# Patient Record
Sex: FEMALE | Race: WHITE | NOT HISPANIC OR LATINO | Employment: UNEMPLOYED | URBAN - METROPOLITAN AREA
[De-identification: names, ages, dates, MRNs, and addresses within clinical notes are randomized per-mention and may not be internally consistent; named-entity substitution may affect disease eponyms.]

---

## 2023-03-13 ENCOUNTER — OFFICE VISIT (OUTPATIENT)
Dept: PRIMARY CARE | Facility: CLINIC | Age: 53
End: 2023-03-13
Payer: COMMERCIAL

## 2023-03-13 VITALS
TEMPERATURE: 97.4 F | HEIGHT: 68 IN | WEIGHT: 161.8 LBS | OXYGEN SATURATION: 98 % | DIASTOLIC BLOOD PRESSURE: 85 MMHG | SYSTOLIC BLOOD PRESSURE: 140 MMHG | HEART RATE: 77 BPM | BODY MASS INDEX: 24.52 KG/M2

## 2023-03-13 DIAGNOSIS — J01.01 ACUTE RECURRENT MAXILLARY SINUSITIS: Primary | ICD-10-CM

## 2023-03-13 DIAGNOSIS — J02.9 ACUTE INFECTIVE PHARYNGITIS: ICD-10-CM

## 2023-03-13 DIAGNOSIS — M79.7 FIBROMYALGIA: ICD-10-CM

## 2023-03-13 DIAGNOSIS — E03.9 ACQUIRED HYPOTHYROIDISM: ICD-10-CM

## 2023-03-13 DIAGNOSIS — G43.109 MIGRAINE WITH AURA AND WITHOUT STATUS MIGRAINOSUS, NOT INTRACTABLE: ICD-10-CM

## 2023-03-13 DIAGNOSIS — B96.20 E-COLI UTI: ICD-10-CM

## 2023-03-13 DIAGNOSIS — M24.60 ANKYLOSIS: ICD-10-CM

## 2023-03-13 DIAGNOSIS — N39.0 E-COLI UTI: ICD-10-CM

## 2023-03-13 DIAGNOSIS — E78.2 MIXED HYPERLIPIDEMIA: ICD-10-CM

## 2023-03-13 PROBLEM — G43.909 MIGRAINE, UNSPECIFIED, NOT INTRACTABLE, WITHOUT STATUS MIGRAINOSUS: Status: ACTIVE | Noted: 2023-03-13

## 2023-03-13 PROBLEM — E78.5 HYPERLIPIDEMIA: Status: ACTIVE | Noted: 2023-03-13

## 2023-03-13 PROBLEM — G89.18 POST-OPERATIVE PAIN: Status: ACTIVE | Noted: 2023-03-13

## 2023-03-13 PROBLEM — J32.9 SINUSITIS: Status: ACTIVE | Noted: 2023-03-13

## 2023-03-13 LAB
BASOPHILS (10*3/UL) IN BLOOD BY AUTOMATED COUNT: 0.05 X10E9/L (ref 0–0.1)
BASOPHILS/100 LEUKOCYTES IN BLOOD BY AUTOMATED COUNT: 0.9 % (ref 0–2)
EOSINOPHILS (10*3/UL) IN BLOOD BY AUTOMATED COUNT: 0.03 X10E9/L (ref 0–0.7)
EOSINOPHILS/100 LEUKOCYTES IN BLOOD BY AUTOMATED COUNT: 0.6 % (ref 0–6)
ERYTHROCYTE DISTRIBUTION WIDTH (RATIO) BY AUTOMATED COUNT: 14.1 % (ref 11.5–14.5)
ERYTHROCYTE MEAN CORPUSCULAR HEMOGLOBIN CONCENTRATION (G/DL) BY AUTOMATED: 31.3 G/DL (ref 32–36)
ERYTHROCYTE MEAN CORPUSCULAR VOLUME (FL) BY AUTOMATED COUNT: 93 FL (ref 80–100)
ERYTHROCYTES (10*6/UL) IN BLOOD BY AUTOMATED COUNT: 3.98 X10E12/L (ref 4–5.2)
HEMATOCRIT (%) IN BLOOD BY AUTOMATED COUNT: 37.1 % (ref 36–46)
HEMOGLOBIN (G/DL) IN BLOOD: 11.6 G/DL (ref 12–16)
IMMATURE GRANULOCYTES/100 LEUKOCYTES IN BLOOD BY AUTOMATED COUNT: 0.2 % (ref 0–0.9)
LEUKOCYTES (10*3/UL) IN BLOOD BY AUTOMATED COUNT: 5.4 X10E9/L (ref 4.4–11.3)
LYMPHOCYTES (10*3/UL) IN BLOOD BY AUTOMATED COUNT: 2 X10E9/L (ref 1.2–4.8)
LYMPHOCYTES/100 LEUKOCYTES IN BLOOD BY AUTOMATED COUNT: 37 % (ref 13–44)
MONOCYTES (10*3/UL) IN BLOOD BY AUTOMATED COUNT: 0.55 X10E9/L (ref 0.1–1)
MONOCYTES/100 LEUKOCYTES IN BLOOD BY AUTOMATED COUNT: 10.2 % (ref 2–10)
NEUTROPHILS (10*3/UL) IN BLOOD BY AUTOMATED COUNT: 2.77 X10E9/L (ref 1.2–7.7)
NEUTROPHILS/100 LEUKOCYTES IN BLOOD BY AUTOMATED COUNT: 51.1 % (ref 40–80)
NON-UH HIE RAPID STREP AG: NEGATIVE
NON-UH HIE RS INT QC: PRESENT
NRBC (PER 100 WBCS) BY AUTOMATED COUNT: 0 /100 WBC (ref 0–0)
PLATELETS (10*3/UL) IN BLOOD AUTOMATED COUNT: 438 X10E9/L (ref 150–450)
SEDIMENTATION RATE, ERYTHROCYTE: 35 MM/H (ref 0–30)

## 2023-03-13 PROCEDURE — 87086 URINE CULTURE/COLONY COUNT: CPT

## 2023-03-13 PROCEDURE — 96372 THER/PROPH/DIAG INJ SC/IM: CPT | Performed by: INTERNAL MEDICINE

## 2023-03-13 PROCEDURE — 80053 COMPREHEN METABOLIC PANEL: CPT

## 2023-03-13 PROCEDURE — 99214 OFFICE O/P EST MOD 30 MIN: CPT | Performed by: INTERNAL MEDICINE

## 2023-03-13 PROCEDURE — 86235 NUCLEAR ANTIGEN ANTIBODY: CPT

## 2023-03-13 PROCEDURE — 36415 COLL VENOUS BLD VENIPUNCTURE: CPT

## 2023-03-13 PROCEDURE — 84439 ASSAY OF FREE THYROXINE: CPT

## 2023-03-13 PROCEDURE — 86039 ANTINUCLEAR ANTIBODIES (ANA): CPT

## 2023-03-13 PROCEDURE — 85652 RBC SED RATE AUTOMATED: CPT

## 2023-03-13 PROCEDURE — 84550 ASSAY OF BLOOD/URIC ACID: CPT

## 2023-03-13 PROCEDURE — 86038 ANTINUCLEAR ANTIBODIES: CPT

## 2023-03-13 PROCEDURE — 83735 ASSAY OF MAGNESIUM: CPT

## 2023-03-13 PROCEDURE — 84443 ASSAY THYROID STIM HORMONE: CPT

## 2023-03-13 PROCEDURE — 85025 COMPLETE CBC W/AUTO DIFF WBC: CPT

## 2023-03-13 PROCEDURE — 86431 RHEUMATOID FACTOR QUANT: CPT

## 2023-03-13 PROCEDURE — 1036F TOBACCO NON-USER: CPT | Performed by: INTERNAL MEDICINE

## 2023-03-13 PROCEDURE — 86225 DNA ANTIBODY NATIVE: CPT

## 2023-03-13 RX ORDER — SULFASALAZINE 500 MG/1
1000 TABLET, DELAYED RELEASE ORAL 2 TIMES DAILY
COMMUNITY
End: 2023-11-16 | Stop reason: WASHOUT

## 2023-03-13 RX ORDER — ATORVASTATIN CALCIUM 10 MG/1
1 TABLET, FILM COATED ORAL DAILY
COMMUNITY
Start: 2019-09-12 | End: 2023-06-12 | Stop reason: SDUPTHER

## 2023-03-13 RX ORDER — AMOXICILLIN 500 MG/1
500 TABLET, FILM COATED ORAL EVERY 8 HOURS SCHEDULED
Qty: 21 TABLET | Refills: 0 | Status: SHIPPED | OUTPATIENT
Start: 2023-03-13 | End: 2023-03-21 | Stop reason: ALTCHOICE

## 2023-03-13 RX ORDER — CEFTRIAXONE 1 G/1
1 INJECTION, POWDER, FOR SOLUTION INTRAMUSCULAR; INTRAVENOUS ONCE
Status: COMPLETED | OUTPATIENT
Start: 2023-03-13 | End: 2023-03-13

## 2023-03-13 RX ORDER — LEVOTHYROXINE SODIUM 25 UG/1
1.5 TABLET ORAL DAILY
COMMUNITY
Start: 2014-05-08 | End: 2023-03-21 | Stop reason: SDUPTHER

## 2023-03-13 RX ORDER — ELETRIPTAN HYDROBROMIDE 40 MG/1
40 TABLET, FILM COATED ORAL
COMMUNITY
Start: 2020-10-09 | End: 2023-11-16 | Stop reason: WASHOUT

## 2023-03-13 RX ORDER — METHYLPREDNISOLONE 4 MG/1
TABLET ORAL
Qty: 21 TABLET | Refills: 0 | Status: SHIPPED | OUTPATIENT
Start: 2023-03-13 | End: 2023-03-21 | Stop reason: ALTCHOICE

## 2023-03-13 RX ORDER — CELECOXIB 200 MG/1
200 CAPSULE ORAL DAILY PRN
COMMUNITY
End: 2023-11-16 | Stop reason: WASHOUT

## 2023-03-13 RX ORDER — DULOXETIN HYDROCHLORIDE 30 MG/1
30 CAPSULE, DELAYED RELEASE ORAL EVERY EVENING
COMMUNITY
Start: 2019-11-21 | End: 2023-06-02 | Stop reason: SDUPTHER

## 2023-03-13 RX ORDER — FLUTICASONE PROPIONATE 50 MCG
2 SPRAY, SUSPENSION (ML) NASAL DAILY
COMMUNITY
Start: 2013-05-16 | End: 2023-11-16 | Stop reason: WASHOUT

## 2023-03-13 RX ADMIN — CEFTRIAXONE 1 G: 1 INJECTION, POWDER, FOR SOLUTION INTRAMUSCULAR; INTRAVENOUS at 10:51

## 2023-03-13 NOTE — PROGRESS NOTES
Subjective   Patient ID: Deidra Chacon is a 53 y.o. female who presents for Establish Care (Not feeling well /Sinus pressure/Sore throat/Fever on and off yesterday /Body aches /All since Saturday ).    Assessment/Plan   Problem List Items Addressed This Visit          Musculoskeletal    Fibromyalgia - Primary     Advised follow-up with the rheumatologist continue sulfasalazine and Celebrex Cymbalta         Relevant Orders    CBC and Auto Differential    Comprehensive Metabolic Panel    Magnesium    TSH with reflex to Free T4 if abnormal    Arthritis Panel (CMS)    Ankylosis     Physical therapy         Relevant Orders    CBC and Auto Differential    Comprehensive Metabolic Panel    Magnesium    TSH with reflex to Free T4 if abnormal    Arthritis Panel (CMS)       Endocrine/Metabolic    Hypothyroidism     Check T3-T4 TSH continue Synthroid 25 mcg a day         Relevant Orders    CBC and Auto Differential    Comprehensive Metabolic Panel    Magnesium    TSH with reflex to Free T4 if abnormal    Arthritis Panel (CMS)       Infectious/Inflammatory    Sinusitis     Acute on chronic maxillary recurrent sinusitis with immunocompromised host given Zithromax Medrol Dosepak Rocephin 1 g intramuscular watch for C. difficile colitis and psychosis         Relevant Orders    CBC and Auto Differential    Comprehensive Metabolic Panel    Magnesium    TSH with reflex to Free T4 if abnormal    Arthritis Panel (CMS)    Acute infective pharyngitis     Given antibiotic steroid sent for throat culture         Relevant Orders    CBC and Auto Differential    Comprehensive Metabolic Panel    Magnesium    TSH with reflex to Free T4 if abnormal    Arthritis Panel (CMS)       Other    Hyperlipidemia     Low-fat diet exercise continue Lipitor 10 mg a day         Relevant Orders    CBC and Auto Differential    Comprehensive Metabolic Panel    Magnesium    TSH with reflex to Free T4 if abnormal    Arthritis Panel (CMS)    Migraine, unspecified, not  intractable, without status migrainosus     Continue Relpax         Relevant Orders    CBC and Auto Differential    Comprehensive Metabolic Panel    Magnesium    TSH with reflex to Free T4 if abnormal    Arthritis Panel (CMS)     Other Visit Diagnoses       E-coli UTI        Relevant Orders    Urine Culture          Check CBC BMP TSH lipid hemoglobin A1c urine culture sensitivity and throat culture  Source of history: Nurse, Medical personnel, Medical record, Patient.  History limitation: None.    HPI    Not feeling well Sinus pressure Sore throat Fever on and off yesterday Body aches All since Saturday    Complaining of sore throat sinus pressure onset acutely duration 1 week progressed acutely aggravating factor immunocompromised host change of the weather yellow-green phlegm coming out    Negative for loss of taste or smell    Negative for hematuria rectal bleeding    Patient also had because of underlying infection inflammation allergy migraine attack to the migraine medicine feel better    See you very about polypharmacy we will going to discussed with the OB/GYN and rheumatologist about changing medication.    Allergies   Allergen Reactions   • Azithromycin Unknown   • Oseltamivir Itching       Current Outpatient Medications   Medication Sig Dispense Refill   • atorvastatin (Lipitor) 10 mg tablet Take 1 tablet (10 mg) by mouth once daily.     • celecoxib (CeleBREX) 200 mg capsule Take 1 capsule (200 mg) by mouth once daily as needed.     • DULoxetine (Cymbalta) 30 mg DR capsule Take 1 capsule (30 mg) by mouth once daily in the evening.     • eletriptan (Relpax) 40 mg tablet Take 1 tablet (40 mg) by mouth. Onset on migraine. May repeat in 2 hours. Max 2 doses/24 hours.no more than 3 days per week     • fluticasone (Flonase) 50 mcg/actuation nasal spray Administer 2 sprays into each nostril once daily.     • levothyroxine (Synthroid, Levoxyl) 25 mcg tablet Take 1.5 tablets (37.5 mcg) by mouth once daily.     •  "sulfaSALAzine (Azulfidine) 500 mg DR tablet Take 2 tablets (1,000 mg) by mouth in the morning and 2 tablets (1,000 mg) before bedtime. Do not crush, chew, or split. .       No current facility-administered medications for this visit.       Objective   Visit Vitals  /85 (BP Location: Left arm, Patient Position: Sitting, BP Cuff Size: Adult)   Pulse 77   Temp 36.3 °C (97.4 °F)   Ht 1.727 m (5' 8\")   Wt 73.4 kg (161 lb 12.8 oz)   SpO2 98%   BMI 24.60 kg/m²   Smoking Status Never   BSA 1.88 m²     Physical Exam  Review of Systems    No visits with results within 4 Month(s) from this visit.   Latest known visit with results is:   Legacy Encounter on 09/07/2021   Component Date Value Ref Range Status   • Vitamin D, 25-Hydroxy 09/07/2021 35  ng/mL Final   • TSH 09/07/2021 3.22  0.44 - 3.98 mIU/L Final   • Glucose 09/07/2021 101 (H)  74 - 99 mg/dL Final   • Sodium 09/07/2021 141  136 - 145 mmol/L Final   • Potassium 09/07/2021 4.3  3.5 - 5.3 mmol/L Final   • Chloride 09/07/2021 103  98 - 107 mmol/L Final   • Bicarbonate 09/07/2021 28  21 - 32 mmol/L Final   • Anion Gap 09/07/2021 14  10 - 20 mmol/L Final   • Urea Nitrogen 09/07/2021 8  6 - 23 mg/dL Final   • Creatinine 09/07/2021 0.67  0.50 - 1.05 mg/dL Final   • GLOMERULAR FILTRATION RATE-NON AFR* 09/07/2021 >60  >60 mL/min/1.73m2 Final   • GLOMERULAR FILTRATION RATE-* 09/07/2021 >60  >60 mL/min/1.73m2 Final   • Calcium 09/07/2021 9.0  8.6 - 10.6 mg/dL Final   • Albumin 09/07/2021 4.2  3.4 - 5.0 g/dL Final   • Alkaline Phosphatase 09/07/2021 100  33 - 110 U/L Final   • Total Protein 09/07/2021 6.7  6.4 - 8.2 g/dL Final   • AST 09/07/2021 18  9 - 39 U/L Final   • Total Bilirubin 09/07/2021 0.4  0.0 - 1.2 mg/dL Final   • ALT (SGPT) 09/07/2021 17  7 - 45 U/L Final   • WBC 09/07/2021 5.3  4.4 - 11.3 x10E9/L Final   • nRBC 09/07/2021 0.0  0.0 - 0.0 /100 WBC Final   • RBC 09/07/2021 3.64 (L)  4.00 - 5.20 x10E12/L Final   • Hemoglobin 09/07/2021 11.2 (L)  12.0 - 16.0 " g/dL Final   • Hematocrit 09/07/2021 36.4  36.0 - 46.0 % Final   • MCV 09/07/2021 100  80 - 100 fL Final   • MCHC 09/07/2021 30.8 (L)  32.0 - 36.0 g/dL Final   • Platelets 09/07/2021 381  150 - 450 x10E9/L Final   • RDW 09/07/2021 13.2  11.5 - 14.5 % Final   • Neutrophils % 09/07/2021 60.2  40.0 - 80.0 % Final   • Immature Granulocytes %, Automated 09/07/2021 0.4  0.0 - 0.9 % Final   • Lymphocytes % 09/07/2021 30.1  13.0 - 44.0 % Final   • Monocytes % 09/07/2021 7.5  2.0 - 10.0 % Final   • Eosinophils % 09/07/2021 0.9  0.0 - 6.0 % Final   • Basophils % 09/07/2021 0.9  0.0 - 2.0 % Final   • Neutrophils Absolute 09/07/2021 3.21  1.20 - 7.70 x10E9/L Final   • Lymphocytes Absolute 09/07/2021 1.61  1.20 - 4.80 x10E9/L Final   • Monocytes Absolute 09/07/2021 0.40  0.10 - 1.00 x10E9/L Final   • Eosinophils Absolute 09/07/2021 0.05  0.00 - 0.70 x10E9/L Final   • Basophils Absolute 09/07/2021 0.05  0.00 - 0.10 x10E9/L Final   • Cortisol- AM 09/07/2021 16.7  5.0 - 20.0 ug/dL Final   • Hemoglobin A1C 09/07/2021 5.2  % Final   • Estimated Average Glucose 09/07/2021 103  MG/DL Final   • Cholesterol 09/07/2021 174  0 - 199 mg/dL Final   • HDL 09/07/2021 77.4  mg/dL Final   • Cholesterol/HDL Ratio 09/07/2021 2.2   Final   • LDL 09/07/2021 83  0 - 99 mg/dL Final   • VLDL 09/07/2021 13  0 - 40 mg/dL Final   • Triglycerides 09/07/2021 66  0 - 149 mg/dL Final       Radiology: Reviewed imaging in powerchart.  No results found.    Family History   Problem Relation Name Age of Onset   • COPD Mother          severe   • Other (cardiac disorder) Father     • Diabetes Father     • Migraines Father     • Cancer Maternal Grandmother     • Cancer Maternal Grandfather     • Heart failure Paternal Grandmother     • Heart attack Paternal Grandfather       Social History     Socioeconomic History   • Marital status:      Spouse name: None   • Number of children: None   • Years of education: None   • Highest education level: None    Occupational History   • None   Tobacco Use   • Smoking status: Never   • Smokeless tobacco: Never   Vaping Use   • Vaping status: None   Substance and Sexual Activity   • Alcohol use: Yes     Comment: occasional   • Drug use: Never   • Sexual activity: None   Other Topics Concern   • None   Social History Narrative   • None     Social Determinants of Health     Financial Resource Strain: Not on file   Food Insecurity: Not on file   Transportation Needs: Not on file   Physical Activity: Not on file   Stress: Not on file   Social Connections: Not on file   Intimate Partner Violence: Not on file   Housing Stability: Not on file     Past Medical History:   Diagnosis Date   • Candidiasis, unspecified 08/06/2020    Yeast infection   • Cough, unspecified 08/01/2016    Cough   • Inflammatory polyarthropathy (CMS/[facility]) 09/12/2019    Polyarthritis, inflammatory   • Other specified complications of surgical and medical care, not elsewhere classified, initial encounter 12/02/2021    Fluid collection at surgical site   • Otitis media, unspecified, unspecified ear 01/12/2016    Ear infection   • Pain in left hip 05/22/2018    Left hip pain   • Pain in right knee 09/21/2016    Right knee pain   • Pain in right shoulder 09/12/2019    Right shoulder pain, unspecified chronicity   • Pelvic and perineal pain 10/14/2021    Female pelvic pain   • Personal history of diseases of the blood and blood-forming organs and certain disorders involving the immune mechanism 09/12/2019    History of autoimmune disorder   • Personal history of other diseases of the digestive system 12/02/2021    History of umbilical hernia   • Personal history of other diseases of the digestive system 12/10/2020    History of hemorrhoids   • Personal history of other diseases of the musculoskeletal system and connective tissue 06/17/2019    History of low back pain   • Personal history of other diseases of the musculoskeletal system and connective tissue  06/23/2016    History of bursitis   • Personal history of other diseases of the nervous system and sense organs 09/02/2021    History of carpal tunnel syndrome   • Personal history of other endocrine, nutritional and metabolic disease 12/17/2019    History of vitamin D deficiency   • Personal history of other mental and behavioral disorders 07/27/2020    History of depression   • Personal history of other specified conditions     History of abdominal pain   • Personal history of urinary (tract) infections     History of urinary tract infection   • Radiculopathy, cervical region 09/09/2019    Cervical radiculopathy   • Radiculopathy, lumbar region 06/17/2019    Lumbar radiculopathy   • Radiculopathy, lumbar region 09/09/2019    Chronic lumbar radiculopathy   • Spinal stenosis, lumbar region without neurogenic claudication 01/19/2021    Spinal stenosis of lumbar region without neurogenic claudication   • Unspecified urinary incontinence 10/07/2021    Urinary incontinence in female   • Urge incontinence 10/07/2021    Urge incontinence     Past Surgical History:   Procedure Laterality Date   • HYSTERECTOMY  08/01/2016    Hysterectomy   • OTHER SURGICAL HISTORY  10/07/2021    Carpal tunnel surgery   • OTHER SURGICAL HISTORY  10/07/2021    Vaginal sling procedure   • OTHER SURGICAL HISTORY  12/02/2021    Umbilical hernia repair laparoscopic   • SINUS SURGERY  08/01/2016    Sinus Surgery       Charting was completed using voice recognition technology and may include unintended errors.

## 2023-03-13 NOTE — ASSESSMENT & PLAN NOTE
Acute on chronic maxillary recurrent sinusitis with immunocompromised host given Zithromax Medrol Dosepak Rocephin 1 g intramuscular watch for C. difficile colitis and psychosis

## 2023-03-14 ENCOUNTER — TELEPHONE (OUTPATIENT)
Dept: PRIMARY CARE | Facility: CLINIC | Age: 53
End: 2023-03-14
Payer: COMMERCIAL

## 2023-03-14 LAB
ALANINE AMINOTRANSFERASE (SGPT) (U/L) IN SER/PLAS: 9 U/L (ref 7–45)
ALBUMIN (G/DL) IN SER/PLAS: 4.5 G/DL (ref 3.4–5)
ALKALINE PHOSPHATASE (U/L) IN SER/PLAS: 83 U/L (ref 33–110)
ANION GAP IN SER/PLAS: 13 MMOL/L (ref 10–20)
ASPARTATE AMINOTRANSFERASE (SGOT) (U/L) IN SER/PLAS: 12 U/L (ref 9–39)
BILIRUBIN TOTAL (MG/DL) IN SER/PLAS: 0.3 MG/DL (ref 0–1.2)
CALCIUM (MG/DL) IN SER/PLAS: 9.8 MG/DL (ref 8.6–10.6)
CARBON DIOXIDE, TOTAL (MMOL/L) IN SER/PLAS: 29 MMOL/L (ref 21–32)
CHLORIDE (MMOL/L) IN SER/PLAS: 103 MMOL/L (ref 98–107)
CREATININE (MG/DL) IN SER/PLAS: 0.63 MG/DL (ref 0.5–1.05)
GFR FEMALE: >90 ML/MIN/1.73M2
GLUCOSE (MG/DL) IN SER/PLAS: 79 MG/DL (ref 74–99)
MAGNESIUM (MG/DL) IN SER/PLAS: 2.24 MG/DL (ref 1.6–2.4)
POTASSIUM (MMOL/L) IN SER/PLAS: 4.7 MMOL/L (ref 3.5–5.3)
PROTEIN TOTAL: 7.1 G/DL (ref 6.4–8.2)
RHEUMATOID FACTOR (IU/ML) IN SERUM OR PLASMA: <10 IU/ML (ref 0–15)
SODIUM (MMOL/L) IN SER/PLAS: 140 MMOL/L (ref 136–145)
THYROTROPIN (MIU/L) IN SER/PLAS BY DETECTION LIMIT <= 0.05 MIU/L: 5.11 MIU/L (ref 0.44–3.98)
THYROXINE (T4) FREE (NG/DL) IN SER/PLAS: 1.09 NG/DL (ref 0.78–1.48)
URATE (MG/DL) IN SER/PLAS: 3.3 MG/DL (ref 2.3–6.7)
UREA NITROGEN (MG/DL) IN SER/PLAS: 11 MG/DL (ref 6–23)

## 2023-03-14 NOTE — TELEPHONE ENCOUNTER
----- Message from Han Caldera MD sent at 3/14/2023  8:11 AM EDT -----  Multiple abnormal test follow-up in a 1 week in office bring all medication with you

## 2023-03-15 LAB
ANA PATTERN: ABNORMAL
ANA TITER: ABNORMAL
ANTI-CENTROMERE: <0.2 AI
ANTI-CHROMATIN: <0.2 AI
ANTI-DNA (DS): 8 IU/ML
ANTI-JO-1 IGG: <0.2 AI
ANTI-NUCLEAR ANTIBODY (ANA): POSITIVE
ANTI-RIBOSOMAL P: <0.2 AI
ANTI-RNP: 4 AI
ANTI-SCL-70: <0.2 AI
ANTI-SM/RNP: <0.2 AI
ANTI-SM: <0.2 AI
ANTI-SSA: <0.2 AI
ANTI-SSB: <0.2 AI
URINE CULTURE: NORMAL

## 2023-03-16 ENCOUNTER — TELEPHONE (OUTPATIENT)
Dept: PRIMARY CARE | Facility: CLINIC | Age: 53
End: 2023-03-16
Payer: COMMERCIAL

## 2023-03-16 NOTE — TELEPHONE ENCOUNTER
----- Message from Han Caldera MD sent at 3/16/2023 11:26 AM EDT -----  Your autoimmune work is abnormal advised follow-up with the rheumatologist possible considering Humira injection

## 2023-03-21 ENCOUNTER — OFFICE VISIT (OUTPATIENT)
Dept: PRIMARY CARE | Facility: CLINIC | Age: 53
End: 2023-03-21
Payer: COMMERCIAL

## 2023-03-21 VITALS
WEIGHT: 161 LBS | SYSTOLIC BLOOD PRESSURE: 108 MMHG | BODY MASS INDEX: 24.4 KG/M2 | HEART RATE: 88 BPM | HEIGHT: 68 IN | DIASTOLIC BLOOD PRESSURE: 65 MMHG | OXYGEN SATURATION: 98 %

## 2023-03-21 DIAGNOSIS — R76.8 ANTI-RNP ANTIBODIES PRESENT: ICD-10-CM

## 2023-03-21 DIAGNOSIS — D50.0 IRON DEFICIENCY ANEMIA DUE TO CHRONIC BLOOD LOSS: ICD-10-CM

## 2023-03-21 DIAGNOSIS — E78.2 MIXED HYPERLIPIDEMIA: ICD-10-CM

## 2023-03-21 DIAGNOSIS — E03.9 ACQUIRED HYPOTHYROIDISM: Primary | ICD-10-CM

## 2023-03-21 PROBLEM — J32.9 SINUSITIS: Status: RESOLVED | Noted: 2023-03-13 | Resolved: 2023-03-21

## 2023-03-21 PROBLEM — J02.9 ACUTE INFECTIVE PHARYNGITIS: Status: RESOLVED | Noted: 2023-03-13 | Resolved: 2023-03-21

## 2023-03-21 PROCEDURE — 99213 OFFICE O/P EST LOW 20 MIN: CPT | Performed by: INTERNAL MEDICINE

## 2023-03-21 PROCEDURE — 1036F TOBACCO NON-USER: CPT | Performed by: INTERNAL MEDICINE

## 2023-03-21 RX ORDER — LEVOTHYROXINE SODIUM 25 UG/1
50 TABLET ORAL DAILY
Qty: 90 TABLET | Refills: 1 | Status: SHIPPED | OUTPATIENT
Start: 2023-03-21 | End: 2023-03-23

## 2023-03-21 NOTE — PROGRESS NOTES
Patient ID: Deidra Chacon is a 53 y.o. female who presents for Establish Care (Go over blood work ).    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  History limitation: None.      HPI arthralgia myalgia fatigue tired weakness onset gradually duration few weeks to months progress slowly aggravating factor change of the weather stress relieving factors none associated problem autoimmune disease positive anti-RNP antibody and anti-DNA antibody and uncontrolled hypothyroidism    Negative for suicide    Negative for fever    Negative for nausea vomiting diarrhea constipation    Allergies   Allergen Reactions    Azithromycin Unknown    Oseltamivir Itching       Current Outpatient Medications   Medication Sig Dispense Refill    atorvastatin (Lipitor) 10 mg tablet Take 1 tablet (10 mg) by mouth once daily.      celecoxib (CeleBREX) 200 mg capsule Take 1 capsule (200 mg) by mouth once daily as needed.      DULoxetine (Cymbalta) 30 mg DR capsule Take 1 capsule (30 mg) by mouth once daily in the evening.      eletriptan (Relpax) 40 mg tablet Take 1 tablet (40 mg) by mouth. Onset on migraine. May repeat in 2 hours. Max 2 doses/24 hours.no more than 3 days per week      fluticasone (Flonase) 50  "mcg/actuation nasal spray Administer 2 sprays into each nostril once daily.      sulfaSALAzine (Azulfidine) 500 mg DR tablet Take 2 tablets (1,000 mg) by mouth in the morning and 2 tablets (1,000 mg) before bedtime. Do not crush, chew, or split. .      levothyroxine (Synthroid, Levoxyl) 25 mcg tablet Take 2 tablets (50 mcg) by mouth once daily. 90 tablet 1     No current facility-administered medications for this visit.       Objective   Visit Vitals  /65 (BP Location: Left arm, Patient Position: Sitting, BP Cuff Size: Adult)   Pulse 88   Ht 1.727 m (5' 8\")   Wt 73 kg (161 lb)   SpO2 98%   BMI 24.48 kg/m²   Smoking Status Never   BSA 1.87 m²       PHYSICAL EXAM  General: NAD. NCAT. Aox3   HEENT: PERRLA. EOMI. MMM. Nares patent bl.  Cardiovascular: RRR. No MRG. S1/S2 wnl.   Respiratory: CTABL. No acute respiratory distress.   GI: Soft, NT abdomen. BS present x 4.   : No CVAT BL  MSK: Myalgia arthralgia anemia  Extremities: Multiple muscle and joint tenderness myalgia arthralgia  Skin: No rashes or bruises.   Neuro: Aox3. Cranial Nerves grossly intact. Motor/sensory wnl.   Psych: Mood wnl.      ROS  Constitutional: Denies fevers, chills, fatigue, weight loss/gain  HEENT: Denies HA, vision changes, hearing loss, sore throat  Cardiac: Denies CP, palpitations, edema  Respiratory: Denies SOB, cough, pleuritic chest pain, PND, orthopnea  GI: Denies N/V/D, abd pain, constipation, black/bloody stools  : Denies urinary changes, frequency, hematuria, urgency, retention, flank pain  MSK: Denies joint pain, joint swelling, back pain, neck pain, extremity pain  Neuro: Denies numbness, weakness, tingling    Immunization History   Administered Date(s) Administered    Pneumococcal Conjugate PCV 13 09/09/2019           Radiology: Reviewed imaging in powerchart.  No results found.    Family History   Problem Relation Name Age of Onset    COPD Mother          severe    Other (cardiac disorder) Father      Diabetes Father      " Migraines Father      Cancer Maternal Grandmother      Cancer Maternal Grandfather      Heart failure Paternal Grandmother      Heart attack Paternal Grandfather       Social History     Socioeconomic History    Marital status:      Spouse name: None    Number of children: None    Years of education: None    Highest education level: None   Occupational History    None   Tobacco Use    Smoking status: Never    Smokeless tobacco: Never   Vaping Use    Vaping status: None   Substance and Sexual Activity    Alcohol use: Yes     Comment: occasional    Drug use: Never    Sexual activity: None   Other Topics Concern    None   Social History Narrative    None     Social Determinants of Health     Financial Resource Strain: Not on file   Food Insecurity: Not on file   Transportation Needs: Not on file   Physical Activity: Not on file   Stress: Not on file   Social Connections: Not on file   Intimate Partner Violence: Not on file   Housing Stability: Not on file     Past Medical History:   Diagnosis Date    Candidiasis, unspecified 08/06/2020    Yeast infection    Cough, unspecified 08/01/2016    Cough    Inflammatory polyarthropathy (CMS/HCC) 09/12/2019    Polyarthritis, inflammatory    Other specified complications of surgical and medical care, not elsewhere classified, initial encounter 12/02/2021    Fluid collection at surgical site    Otitis media, unspecified, unspecified ear 01/12/2016    Ear infection    Pain in left hip 05/22/2018    Left hip pain    Pain in right knee 09/21/2016    Right knee pain    Pain in right shoulder 09/12/2019    Right shoulder pain, unspecified chronicity    Pelvic and perineal pain 10/14/2021    Female pelvic pain    Personal history of diseases of the blood and blood-forming organs and certain disorders involving the immune mechanism 09/12/2019    History of autoimmune disorder    Personal history of other diseases of the digestive system 12/02/2021    History of umbilical hernia     Personal history of other diseases of the digestive system 12/10/2020    History of hemorrhoids    Personal history of other diseases of the musculoskeletal system and connective tissue 06/17/2019    History of low back pain    Personal history of other diseases of the musculoskeletal system and connective tissue 06/23/2016    History of bursitis    Personal history of other diseases of the nervous system and sense organs 09/02/2021    History of carpal tunnel syndrome    Personal history of other endocrine, nutritional and metabolic disease 12/17/2019    History of vitamin D deficiency    Personal history of other mental and behavioral disorders 07/27/2020    History of depression    Personal history of other specified conditions     History of abdominal pain    Personal history of urinary (tract) infections     History of urinary tract infection    Radiculopathy, cervical region 09/09/2019    Cervical radiculopathy    Radiculopathy, lumbar region 06/17/2019    Lumbar radiculopathy    Radiculopathy, lumbar region 09/09/2019    Chronic lumbar radiculopathy    Spinal stenosis, lumbar region without neurogenic claudication 01/19/2021    Spinal stenosis of lumbar region without neurogenic claudication    Unspecified urinary incontinence 10/07/2021    Urinary incontinence in female    Urge incontinence 10/07/2021    Urge incontinence     Past Surgical History:   Procedure Laterality Date    HYSTERECTOMY  08/01/2016    Hysterectomy    OTHER SURGICAL HISTORY  10/07/2021    Carpal tunnel surgery    OTHER SURGICAL HISTORY  10/07/2021    Vaginal sling procedure    OTHER SURGICAL HISTORY  12/02/2021    Umbilical hernia repair laparoscopic    SINUS SURGERY  08/01/2016    Sinus Surgery     * Cannot find OR log *    Charting was completed using voice recognition technology and may include unintended errors.

## 2023-03-23 DIAGNOSIS — E03.9 ACQUIRED HYPOTHYROIDISM: ICD-10-CM

## 2023-03-23 RX ORDER — LEVOTHYROXINE SODIUM 50 UG/1
50 TABLET ORAL DAILY
Qty: 90 TABLET | Refills: 1 | Status: SHIPPED | OUTPATIENT
Start: 2023-03-23 | End: 2023-09-18

## 2023-05-19 ENCOUNTER — LAB (OUTPATIENT)
Dept: LAB | Facility: LAB | Age: 53
End: 2023-05-19
Payer: COMMERCIAL

## 2023-05-19 ENCOUNTER — OFFICE VISIT (OUTPATIENT)
Dept: PRIMARY CARE | Facility: CLINIC | Age: 53
End: 2023-05-19
Payer: COMMERCIAL

## 2023-05-19 VITALS
SYSTOLIC BLOOD PRESSURE: 120 MMHG | TEMPERATURE: 98.7 F | BODY MASS INDEX: 24.55 KG/M2 | DIASTOLIC BLOOD PRESSURE: 75 MMHG | HEART RATE: 77 BPM | OXYGEN SATURATION: 98 % | HEIGHT: 68 IN | WEIGHT: 162 LBS

## 2023-05-19 DIAGNOSIS — L50.9 URTICARIA OF ENTIRE BODY: ICD-10-CM

## 2023-05-19 DIAGNOSIS — E03.8 HYPOTHYROIDISM DUE TO HASHIMOTO'S THYROIDITIS: ICD-10-CM

## 2023-05-19 DIAGNOSIS — E06.3 HYPOTHYROIDISM DUE TO HASHIMOTO'S THYROIDITIS: ICD-10-CM

## 2023-05-19 DIAGNOSIS — R76.8 ANTI-RNP ANTIBODIES PRESENT: ICD-10-CM

## 2023-05-19 DIAGNOSIS — L23.7 POISON IVY: Primary | ICD-10-CM

## 2023-05-19 LAB
ALANINE AMINOTRANSFERASE (SGPT) (U/L) IN SER/PLAS: 12 U/L (ref 7–45)
ALBUMIN (G/DL) IN SER/PLAS: 4.3 G/DL (ref 3.4–5)
ALKALINE PHOSPHATASE (U/L) IN SER/PLAS: 76 U/L (ref 33–110)
ANION GAP IN SER/PLAS: 12 MMOL/L (ref 10–20)
ASPARTATE AMINOTRANSFERASE (SGOT) (U/L) IN SER/PLAS: 15 U/L (ref 9–39)
BASOPHILS (10*3/UL) IN BLOOD BY AUTOMATED COUNT: 0.02 X10E9/L (ref 0–0.1)
BASOPHILS/100 LEUKOCYTES IN BLOOD BY AUTOMATED COUNT: 0.2 % (ref 0–2)
BILIRUBIN TOTAL (MG/DL) IN SER/PLAS: 0.3 MG/DL (ref 0–1.2)
CALCIUM (MG/DL) IN SER/PLAS: 9.6 MG/DL (ref 8.6–10.6)
CARBON DIOXIDE, TOTAL (MMOL/L) IN SER/PLAS: 28 MMOL/L (ref 21–32)
CHLORIDE (MMOL/L) IN SER/PLAS: 102 MMOL/L (ref 98–107)
CREATININE (MG/DL) IN SER/PLAS: 0.68 MG/DL (ref 0.5–1.05)
EOSINOPHILS (10*3/UL) IN BLOOD BY AUTOMATED COUNT: 0.01 X10E9/L (ref 0–0.7)
EOSINOPHILS/100 LEUKOCYTES IN BLOOD BY AUTOMATED COUNT: 0.1 % (ref 0–6)
ERYTHROCYTE DISTRIBUTION WIDTH (RATIO) BY AUTOMATED COUNT: 14 % (ref 11.5–14.5)
ERYTHROCYTE MEAN CORPUSCULAR HEMOGLOBIN CONCENTRATION (G/DL) BY AUTOMATED: 31.1 G/DL (ref 32–36)
ERYTHROCYTE MEAN CORPUSCULAR VOLUME (FL) BY AUTOMATED COUNT: 92 FL (ref 80–100)
ERYTHROCYTES (10*6/UL) IN BLOOD BY AUTOMATED COUNT: 4.36 X10E12/L (ref 4–5.2)
GFR FEMALE: >90 ML/MIN/1.73M2
GLUCOSE (MG/DL) IN SER/PLAS: 88 MG/DL (ref 74–99)
HEMATOCRIT (%) IN BLOOD BY AUTOMATED COUNT: 39.9 % (ref 36–46)
HEMOGLOBIN (G/DL) IN BLOOD: 12.4 G/DL (ref 12–16)
IMMATURE GRANULOCYTES/100 LEUKOCYTES IN BLOOD BY AUTOMATED COUNT: 0.1 % (ref 0–0.9)
LEUKOCYTES (10*3/UL) IN BLOOD BY AUTOMATED COUNT: 9.2 X10E9/L (ref 4.4–11.3)
LYMPHOCYTES (10*3/UL) IN BLOOD BY AUTOMATED COUNT: 1.56 X10E9/L (ref 1.2–4.8)
LYMPHOCYTES/100 LEUKOCYTES IN BLOOD BY AUTOMATED COUNT: 16.9 % (ref 13–44)
MONOCYTES (10*3/UL) IN BLOOD BY AUTOMATED COUNT: 0.49 X10E9/L (ref 0.1–1)
MONOCYTES/100 LEUKOCYTES IN BLOOD BY AUTOMATED COUNT: 5.3 % (ref 2–10)
NEUTROPHILS (10*3/UL) IN BLOOD BY AUTOMATED COUNT: 7.14 X10E9/L (ref 1.2–7.7)
NEUTROPHILS/100 LEUKOCYTES IN BLOOD BY AUTOMATED COUNT: 77.4 % (ref 40–80)
NRBC (PER 100 WBCS) BY AUTOMATED COUNT: 0 /100 WBC (ref 0–0)
PLATELETS (10*3/UL) IN BLOOD AUTOMATED COUNT: 401 X10E9/L (ref 150–450)
POTASSIUM (MMOL/L) IN SER/PLAS: 4.3 MMOL/L (ref 3.5–5.3)
PROTEIN TOTAL: 7.2 G/DL (ref 6.4–8.2)
SODIUM (MMOL/L) IN SER/PLAS: 138 MMOL/L (ref 136–145)
THYROTROPIN (MIU/L) IN SER/PLAS BY DETECTION LIMIT <= 0.05 MIU/L: 1.94 MIU/L (ref 0.44–3.98)
UREA NITROGEN (MG/DL) IN SER/PLAS: 14 MG/DL (ref 6–23)

## 2023-05-19 PROCEDURE — 36415 COLL VENOUS BLD VENIPUNCTURE: CPT

## 2023-05-19 PROCEDURE — 96372 THER/PROPH/DIAG INJ SC/IM: CPT | Performed by: INTERNAL MEDICINE

## 2023-05-19 PROCEDURE — 84443 ASSAY THYROID STIM HORMONE: CPT

## 2023-05-19 PROCEDURE — 99214 OFFICE O/P EST MOD 30 MIN: CPT | Performed by: INTERNAL MEDICINE

## 2023-05-19 PROCEDURE — 85025 COMPLETE CBC W/AUTO DIFF WBC: CPT

## 2023-05-19 PROCEDURE — 1036F TOBACCO NON-USER: CPT | Performed by: INTERNAL MEDICINE

## 2023-05-19 PROCEDURE — 80053 COMPREHEN METABOLIC PANEL: CPT

## 2023-05-19 RX ORDER — FLUOCINONIDE 0.5 MG/G
CREAM TOPICAL 2 TIMES DAILY
Qty: 100 G | Refills: 0 | Status: SHIPPED | OUTPATIENT
Start: 2023-05-19 | End: 2023-11-16 | Stop reason: WASHOUT

## 2023-05-19 RX ORDER — METHYLPREDNISOLONE 4 MG/1
TABLET ORAL
Qty: 21 TABLET | Refills: 0 | Status: SHIPPED | OUTPATIENT
Start: 2023-05-19 | End: 2023-05-26

## 2023-05-19 RX ORDER — HYDROXYZINE HYDROCHLORIDE 25 MG/1
25 TABLET, FILM COATED ORAL 2 TIMES DAILY
Qty: 20 TABLET | Refills: 0 | Status: SHIPPED | OUTPATIENT
Start: 2023-05-19 | End: 2023-11-16 | Stop reason: WASHOUT

## 2023-05-19 RX ORDER — METHYLPREDNISOLONE ACETATE 40 MG/ML
80 INJECTION, SUSPENSION INTRA-ARTICULAR; INTRALESIONAL; INTRAMUSCULAR; SOFT TISSUE ONCE
Status: COMPLETED | OUTPATIENT
Start: 2023-05-19 | End: 2023-05-19

## 2023-05-19 RX ORDER — CETIRIZINE HYDROCHLORIDE 10 MG/1
TABLET ORAL
COMMUNITY
Start: 2021-10-19 | End: 2023-11-16 | Stop reason: WASHOUT

## 2023-05-19 RX ORDER — HYDROXYCHLOROQUINE SULFATE 200 MG/1
2 TABLET, FILM COATED ORAL DAILY
COMMUNITY
Start: 2023-03-27 | End: 2023-11-16 | Stop reason: WASHOUT

## 2023-05-19 RX ORDER — ERGOCALCIFEROL 1.25 MG/1
1 CAPSULE ORAL
COMMUNITY
Start: 2023-05-14

## 2023-05-19 RX ADMIN — METHYLPREDNISOLONE ACETATE 80 MG: 40 INJECTION, SUSPENSION INTRA-ARTICULAR; INTRALESIONAL; INTRAMUSCULAR; SOFT TISSUE at 16:37

## 2023-05-19 NOTE — PROGRESS NOTES
Patient ID: Deidra Chacon is a 53 y.o. female who presents for Rash (All over since yesterday ).    Assessment/Plan     Problem List Items Addressed This Visit          Endocrine/Metabolic    Hypothyroidism     Endocrine work-up check sugar thyroid and autoimmune disease         Relevant Orders    TSH with reflex to Free T4 if abnormal    CBC and Auto Differential    Comprehensive Metabolic Panel       Hematologic    Anti-RNP antibodies present    Relevant Orders    TSH with reflex to Free T4 if abnormal    CBC and Auto Differential    Comprehensive Metabolic Panel       Infectious/Inflammatory    Poison ivy - Primary     Education provided for contact dermatitis            Other    Urticaria of entire body     Given steroid injection advised Lidex cream locally Benadryl Atarax Pepcid probiotics            Source of history: Nurse, Medical personnel, Medical record, Patient.  History limitation: None.      HPI  53-year-old patient of autoimmune disease hypothyroidism hyperlipidemia complaining of the pruritus maculopapular nonvesicular rash from the hip to 2 moderate-sized pruritus insomnia onset acutely duration 48 hours progressed acutely aggravating factor something context rest and sun exposure and reviewed exposure    Negative for choking    Negative for shortness of breath hypoxia    Negative for anaphylaxis    Allergies   Allergen Reactions    Azithromycin Unknown    Cephalexin Other     yeast infections    Oseltamivir Itching and Rash       Medications    Current Outpatient Medications   Medication Sig Dispense Refill    atorvastatin (Lipitor) 10 mg tablet Take 1 tablet (10 mg) by mouth once daily.      celecoxib (CeleBREX) 200 mg capsule Take 1 capsule (200 mg) by mouth once daily as needed. As needed      cetirizine (ZyrTEC) 10 mg tablet Orally Once a day PRN, Date: 08/05/2022 11:50:00 EDT      DULoxetine (Cymbalta) 30 mg DR capsule Take 1 capsule (30 mg) by mouth once daily in the evening.      eletriptan  "(Relpax) 40 mg tablet Take 1 tablet (40 mg) by mouth. Onset on migraine. May repeat in 2 hours. Max 2 doses/24 hours.no more than 3 days per week      ergocalciferol (Vitamin D-2) 1.25 MG (07106 UT) capsule Take 1 capsule (1,250 mcg) by mouth.      fluticasone (Flonase) 50 mcg/actuation nasal spray Administer 2 sprays into each nostril once daily.      hydroxychloroquine (Plaquenil) 200 mg tablet Take 2 tablets (400 mg) by mouth once daily.      levothyroxine (Synthroid, Levoxyl) 50 mcg tablet Take 1 tablet (50 mcg) by mouth once daily. 90 tablet 1    sulfaSALAzine (Azulfidine) 500 mg DR tablet Take 2 tablets (1,000 mg) by mouth 2 times a day. Do not crush, chew, or split.       No current facility-administered medications for this visit.       Objective   Visit Vitals  /75 (BP Location: Left arm, Patient Position: Sitting, BP Cuff Size: Adult)   Pulse 77   Temp 37.1 °C (98.7 °F)   Ht 1.727 m (5' 8\")   Wt 73.5 kg (162 lb)   SpO2 98%   BMI 24.63 kg/m²   Smoking Status Never   BSA 1.88 m²       PHYSICAL EXAM  General: NAD. NCAT. Aox3 anxiety  HEENT: PERRLA. EOMI. MMM. Nares patent bl.  Cardiovascular: RRR. No MRG. S1/S2 wnl.   Respiratory: CTABL. No acute respiratory distress.   GI: Soft, NT abdomen. BS present x 4.   : No CVAT BL  MSK: ROM x 4. CTLS non-tender.   Extremities: No edema. Cap refill < 2 sec.   Skin: Skin rash from head to toe  Neuro: Aox3. Cranial Nerves grossly intact. Motor/sensory wnl.   Psych: Mood wnl.  Pruritus anxiety        ROS  Constitutional: Denies fevers, chills, fatigue, weight loss/gain  HEENT: Denies HA, vision changes, hearing loss, sore throat  Cardiac: Denies CP, palpitations, edema  Respiratory: Denies SOB, cough, pleuritic chest pain, PND, orthopnea  GI: Denies N/V/D, abd pain, constipation, black/bloody stools  : Denies urinary changes, frequency, hematuria, urgency, retention, flank pain  MSK: Denies joint pain, joint swelling, back pain, neck pain, extremity pain  Neuro: " Denies numbness, weakness, tingling    Immunization History   Administered Date(s) Administered    Influenza, seasonal, injectable 10/25/2022    Moderna SARS-CoV-2 Vaccination 03/18/2021, 03/18/2021, 04/15/2021, 04/15/2021, 01/06/2022    Pneumococcal Polysaccharide PPSV23 09/09/2019    Tdap 02/07/2017    Zoster, Unspecified 02/07/2020, 06/07/2020           Radiology: Reviewed imaging in powerchart.  No results found.    Family History   Problem Relation Name Age of Onset    COPD Mother          severe    Other (cardiac disorder) Father      Diabetes Father      Migraines Father      Cancer Maternal Grandmother      Cancer Maternal Grandfather      Heart failure Paternal Grandmother      Heart attack Paternal Grandfather       Social History     Socioeconomic History    Marital status:      Spouse name: None    Number of children: None    Years of education: None    Highest education level: None   Occupational History    None   Tobacco Use    Smoking status: Never    Smokeless tobacco: Never   Vaping Use    Vaping status: None   Substance and Sexual Activity    Alcohol use: Yes     Comment: occasional    Drug use: Never    Sexual activity: None   Other Topics Concern    None   Social History Narrative    None     Social Determinants of Health     Financial Resource Strain: Not on file   Food Insecurity: Not on file   Transportation Needs: Not on file   Physical Activity: Not on file   Stress: Not on file   Social Connections: Not on file   Intimate Partner Violence: Not on file   Housing Stability: Not on file     Past Medical History:   Diagnosis Date    Candidiasis, unspecified 08/06/2020    Yeast infection    Cough, unspecified 08/01/2016    Cough    Inflammatory polyarthropathy (CMS/[facility]) 09/12/2019    Polyarthritis, inflammatory    Other specified complications of surgical and medical care, not elsewhere classified, initial encounter 12/02/2021    Fluid collection at surgical site    Otitis media,  unspecified, unspecified ear 01/12/2016    Ear infection    Pain in left hip 05/22/2018    Left hip pain    Pain in right knee 09/21/2016    Right knee pain    Pain in right shoulder 09/12/2019    Right shoulder pain, unspecified chronicity    Pelvic and perineal pain 10/14/2021    Female pelvic pain    Personal history of diseases of the blood and blood-forming organs and certain disorders involving the immune mechanism 09/12/2019    History of autoimmune disorder    Personal history of other diseases of the digestive system 12/02/2021    History of umbilical hernia    Personal history of other diseases of the digestive system 12/10/2020    History of hemorrhoids    Personal history of other diseases of the musculoskeletal system and connective tissue 06/17/2019    History of low back pain    Personal history of other diseases of the musculoskeletal system and connective tissue 06/23/2016    History of bursitis    Personal history of other diseases of the nervous system and sense organs 09/02/2021    History of carpal tunnel syndrome    Personal history of other endocrine, nutritional and metabolic disease 12/17/2019    History of vitamin D deficiency    Personal history of other mental and behavioral disorders 07/27/2020    History of depression    Personal history of other specified conditions     History of abdominal pain    Personal history of urinary (tract) infections     History of urinary tract infection    Radiculopathy, cervical region 09/09/2019    Cervical radiculopathy    Radiculopathy, lumbar region 06/17/2019    Lumbar radiculopathy    Radiculopathy, lumbar region 09/09/2019    Chronic lumbar radiculopathy    Spinal stenosis, lumbar region without neurogenic claudication 01/19/2021    Spinal stenosis of lumbar region without neurogenic claudication    Unspecified urinary incontinence 10/07/2021    Urinary incontinence in female    Urge incontinence 10/07/2021    Urge incontinence     Past Surgical  History:   Procedure Laterality Date    HYSTERECTOMY  08/01/2016    Hysterectomy    OTHER SURGICAL HISTORY  10/07/2021    Carpal tunnel surgery    OTHER SURGICAL HISTORY  10/07/2021    Vaginal sling procedure    OTHER SURGICAL HISTORY  12/02/2021    Umbilical hernia repair laparoscopic    SINUS SURGERY  08/01/2016    Sinus Surgery     * Cannot find OR log *    Charting was completed using voice recognition technology and may include unintended errors.

## 2023-05-22 ENCOUNTER — TELEPHONE (OUTPATIENT)
Dept: PRIMARY CARE | Facility: CLINIC | Age: 53
End: 2023-05-22

## 2023-05-22 ENCOUNTER — OFFICE VISIT (OUTPATIENT)
Dept: PRIMARY CARE | Facility: CLINIC | Age: 53
End: 2023-05-22
Payer: COMMERCIAL

## 2023-05-22 VITALS
WEIGHT: 162 LBS | DIASTOLIC BLOOD PRESSURE: 84 MMHG | TEMPERATURE: 97.3 F | SYSTOLIC BLOOD PRESSURE: 137 MMHG | HEART RATE: 80 BPM | HEIGHT: 68 IN | BODY MASS INDEX: 24.55 KG/M2 | OXYGEN SATURATION: 98 %

## 2023-05-22 DIAGNOSIS — E03.4 HYPOTHYROIDISM DUE TO ACQUIRED ATROPHY OF THYROID: ICD-10-CM

## 2023-05-22 DIAGNOSIS — L50.9 URTICARIA OF ENTIRE BODY: ICD-10-CM

## 2023-05-22 DIAGNOSIS — L23.7 POISON IVY: ICD-10-CM

## 2023-05-22 DIAGNOSIS — R73.9 HYPERGLYCEMIA: Primary | ICD-10-CM

## 2023-05-22 DIAGNOSIS — R76.8 ANTI-RNP ANTIBODIES PRESENT: ICD-10-CM

## 2023-05-22 PROCEDURE — 96372 THER/PROPH/DIAG INJ SC/IM: CPT | Performed by: INTERNAL MEDICINE

## 2023-05-22 PROCEDURE — 99213 OFFICE O/P EST LOW 20 MIN: CPT | Performed by: INTERNAL MEDICINE

## 2023-05-22 PROCEDURE — 1036F TOBACCO NON-USER: CPT | Performed by: INTERNAL MEDICINE

## 2023-05-22 RX ORDER — METHYLPREDNISOLONE ACETATE 40 MG/ML
40 INJECTION, SUSPENSION INTRA-ARTICULAR; INTRALESIONAL; INTRAMUSCULAR; SOFT TISSUE ONCE
Status: COMPLETED | OUTPATIENT
Start: 2023-05-22 | End: 2023-05-22

## 2023-05-22 RX ADMIN — METHYLPREDNISOLONE ACETATE 40 MG: 40 INJECTION, SUSPENSION INTRA-ARTICULAR; INTRALESIONAL; INTRAMUSCULAR; SOFT TISSUE at 16:08

## 2023-05-22 NOTE — PROGRESS NOTES
Patient ID: Deidra Chacon is a 53 y.o. female who presents for Follow-up (Injection for rash/Go over blood work ).    Assessment/Plan     Problem List Items Addressed This Visit          Endocrine/Metabolic    Hypothyroidism       Hematologic    Anti-RNP antibodies present       Infectious/Inflammatory    Poison ivy       Other    Urticaria of entire body     Other Visit Diagnoses       Hyperglycemia    -  Primary    Relevant Orders    Hemoglobin A1C        Given Solu-Medrol 40 intramuscular continue Lidex cream locally Atarax review blood test refer patient to rheumatologist  Patient was evaluated today, problem list was reviewed, problems and concerns addressed, Rx list reviewed and updated, lab and tests were noted and reviewed. Life style changes were discussed, always it works better if we eat plant based diet and plenty of fibres and roughage. Consume adequate amount of water and avoid alcohol, light to moderate physical activities and stress reduction are always beneficial for ongoing physical well being. Do not forget to have 6 to 7 hours of sleep regularly and avoid late night jeremy screen exposure.      Source of history: Nurse, Medical personnel, Medical record, Patient.  History limitation: None.      HPI  53-year-old patient of autoimmune disease hypothyroidism hyperlipidemia complaining of the pruritus maculopapular nonvesicular rash from the hip to 2 moderate-sized pruritus insomnia onset acutely duration 48 hours progressed acutely aggravating factor something context rest and sun exposure and reviewed exposure    Negative for choking    Negative for shortness of breath hypoxia    Negative for anaphylaxis    Allergies   Allergen Reactions    Azithromycin Unknown    Cephalexin Other     yeast infections    Oseltamivir Itching and Rash       Medications    Current Outpatient Medications   Medication Sig Dispense Refill    atorvastatin (Lipitor) 10 mg tablet Take 1 tablet (10 mg) by mouth once daily.       "celecoxib (CeleBREX) 200 mg capsule Take 1 capsule (200 mg) by mouth once daily as needed. As needed      cetirizine (ZyrTEC) 10 mg tablet Orally Once a day PRN, Date: 08/05/2022 11:50:00 EDT      DULoxetine (Cymbalta) 30 mg DR capsule Take 1 capsule (30 mg) by mouth once daily in the evening.      eletriptan (Relpax) 40 mg tablet Take 1 tablet (40 mg) by mouth. Onset on migraine. May repeat in 2 hours. Max 2 doses/24 hours.no more than 3 days per week      ergocalciferol (Vitamin D-2) 1.25 MG (18567 UT) capsule Take 1 capsule (1,250 mcg) by mouth.      fluocinonide 0.05 % cream Apply topically 2 times a day. 100 g 0    fluticasone (Flonase) 50 mcg/actuation nasal spray Administer 2 sprays into each nostril once daily.      hydroxychloroquine (Plaquenil) 200 mg tablet Take 2 tablets (400 mg) by mouth once daily.      hydrOXYzine HCL (Atarax) 25 mg tablet Take 1 tablet (25 mg) by mouth 2 times a day. 20 tablet 0    levothyroxine (Synthroid, Levoxyl) 50 mcg tablet Take 1 tablet (50 mcg) by mouth once daily. 90 tablet 1    methylPREDNISolone (Medrol Dospak) 4 mg tablets Take as directed on package. 21 tablet 0    sulfaSALAzine (Azulfidine) 500 mg DR tablet Take 2 tablets (1,000 mg) by mouth 2 times a day. Do not crush, chew, or split.       No current facility-administered medications for this visit.       Objective   Visit Vitals  /84 (BP Location: Left arm, Patient Position: Sitting, BP Cuff Size: Adult)   Pulse 80   Temp 36.3 °C (97.3 °F)   Ht 1.727 m (5' 8\")   Wt 73.5 kg (162 lb)   SpO2 98%   BMI 24.63 kg/m²   Smoking Status Never   BSA 1.88 m²       PHYSICAL EXAM  General: NAD. NCAT. Aox3 anxiety  HEENT: PERRLA. EOMI. MMM. Nares patent bl.  Cardiovascular: RRR. No MRG. S1/S2 wnl.   Respiratory: CTABL. No acute respiratory distress.   GI: Soft, NT abdomen. BS present x 4.   : No CVAT BL  MSK: ROM x 4. CTLS non-tender.   Extremities: No edema. Cap refill < 2 sec.   Skin: Skin rash 10% of the body pruritus 70 " minutes  Neuro: Aox3. Cranial Nerves grossly intact. Motor/sensory wnl.   Psych: Mood wnl.  Pruritus anxiety        ROS  Constitutional: Denies fevers, chills, fatigue, weight loss/gain  HEENT: Denies HA, vision changes, hearing loss, sore throat  Cardiac: Denies CP, palpitations, edema  Respiratory: Denies SOB, cough, pleuritic chest pain, PND, orthopnea  GI: Denies N/V/D, abd pain, constipation, black/bloody stools  : Denies urinary changes, frequency, hematuria, urgency, retention, flank pain  MSK: Denies joint pain, joint swelling, back pain, neck pain, extremity pain  Neuro: Denies numbness, weakness, tingling    Immunization History   Administered Date(s) Administered    Influenza, seasonal, injectable 10/25/2022    Moderna SARS-CoV-2 Vaccination 03/18/2021, 03/18/2021, 04/15/2021, 04/15/2021, 01/06/2022    Pneumococcal Polysaccharide PPSV23 09/09/2019    Tdap 02/07/2017    Zoster, Unspecified 02/07/2020, 06/07/2020           Radiology: Reviewed imaging in powerchart.  No results found.    Family History   Problem Relation Name Age of Onset    COPD Mother          severe    Other (cardiac disorder) Father      Diabetes Father      Migraines Father      Cancer Maternal Grandmother      Cancer Maternal Grandfather      Heart failure Paternal Grandmother      Heart attack Paternal Grandfather       Social History     Socioeconomic History    Marital status:      Spouse name: None    Number of children: None    Years of education: None    Highest education level: None   Occupational History    None   Tobacco Use    Smoking status: Never    Smokeless tobacco: Never   Vaping Use    Vaping status: None   Substance and Sexual Activity    Alcohol use: Yes     Comment: occasional    Drug use: Never    Sexual activity: None   Other Topics Concern    None   Social History Narrative    None     Social Determinants of Health     Financial Resource Strain: Not on file   Food Insecurity: Not on file   Transportation  Needs: Not on file   Physical Activity: Not on file   Stress: Not on file   Social Connections: Not on file   Intimate Partner Violence: Not on file   Housing Stability: Not on file     Past Medical History:   Diagnosis Date    Candidiasis, unspecified 08/06/2020    Yeast infection    Cough, unspecified 08/01/2016    Cough    Inflammatory polyarthropathy (CMS/HCC) 09/12/2019    Polyarthritis, inflammatory    Other specified complications of surgical and medical care, not elsewhere classified, initial encounter 12/02/2021    Fluid collection at surgical site    Otitis media, unspecified, unspecified ear 01/12/2016    Ear infection    Pain in left hip 05/22/2018    Left hip pain    Pain in right knee 09/21/2016    Right knee pain    Pain in right shoulder 09/12/2019    Right shoulder pain, unspecified chronicity    Pelvic and perineal pain 10/14/2021    Female pelvic pain    Personal history of diseases of the blood and blood-forming organs and certain disorders involving the immune mechanism 09/12/2019    History of autoimmune disorder    Personal history of other diseases of the digestive system 12/02/2021    History of umbilical hernia    Personal history of other diseases of the digestive system 12/10/2020    History of hemorrhoids    Personal history of other diseases of the musculoskeletal system and connective tissue 06/17/2019    History of low back pain    Personal history of other diseases of the musculoskeletal system and connective tissue 06/23/2016    History of bursitis    Personal history of other diseases of the nervous system and sense organs 09/02/2021    History of carpal tunnel syndrome    Personal history of other endocrine, nutritional and metabolic disease 12/17/2019    History of vitamin D deficiency    Personal history of other mental and behavioral disorders 07/27/2020    History of depression    Personal history of other specified conditions     History of abdominal pain    Personal history  of urinary (tract) infections     History of urinary tract infection    Radiculopathy, cervical region 09/09/2019    Cervical radiculopathy    Radiculopathy, lumbar region 06/17/2019    Lumbar radiculopathy    Radiculopathy, lumbar region 09/09/2019    Chronic lumbar radiculopathy    Spinal stenosis, lumbar region without neurogenic claudication 01/19/2021    Spinal stenosis of lumbar region without neurogenic claudication    Unspecified urinary incontinence 10/07/2021    Urinary incontinence in female    Urge incontinence 10/07/2021    Urge incontinence     Past Surgical History:   Procedure Laterality Date    HYSTERECTOMY  08/01/2016    Hysterectomy    OTHER SURGICAL HISTORY  10/07/2021    Carpal tunnel surgery    OTHER SURGICAL HISTORY  10/07/2021    Vaginal sling procedure    OTHER SURGICAL HISTORY  12/02/2021    Umbilical hernia repair laparoscopic    SINUS SURGERY  08/01/2016    Sinus Surgery     * Cannot find OR log *    Charting was completed using voice recognition technology and may include unintended errors.       Physical Exam

## 2023-05-22 NOTE — TELEPHONE ENCOUNTER
----- Message from Han Caldera MD sent at 5/22/2023 10:50 AM EDT -----  Mild anemia advised Slow Fe 1 a day repeat CBC 3 months

## 2023-06-02 DIAGNOSIS — M79.7 FIBROMYALGIA: ICD-10-CM

## 2023-06-02 RX ORDER — DULOXETIN HYDROCHLORIDE 30 MG/1
30 CAPSULE, DELAYED RELEASE ORAL EVERY EVENING
Qty: 90 CAPSULE | Refills: 3 | Status: SHIPPED | OUTPATIENT
Start: 2023-06-02

## 2023-06-12 DIAGNOSIS — E78.2 MIXED HYPERLIPIDEMIA: ICD-10-CM

## 2023-06-12 RX ORDER — ATORVASTATIN CALCIUM 10 MG/1
10 TABLET, FILM COATED ORAL DAILY
Qty: 90 TABLET | Refills: 3 | Status: SHIPPED | OUTPATIENT
Start: 2023-06-12 | End: 2023-11-16 | Stop reason: WASHOUT

## 2023-09-18 DIAGNOSIS — E03.9 ACQUIRED HYPOTHYROIDISM: ICD-10-CM

## 2023-09-18 RX ORDER — LEVOTHYROXINE SODIUM 50 UG/1
TABLET ORAL
Qty: 90 TABLET | Refills: 0 | Status: SHIPPED | OUTPATIENT
Start: 2023-09-18 | End: 2023-12-14 | Stop reason: SDUPTHER

## 2023-10-25 ENCOUNTER — OFFICE VISIT (OUTPATIENT)
Dept: ORTHOPEDIC SURGERY | Facility: CLINIC | Age: 53
End: 2023-10-25
Payer: COMMERCIAL

## 2023-10-25 VITALS — HEIGHT: 67 IN | WEIGHT: 155 LBS | BODY MASS INDEX: 24.33 KG/M2

## 2023-10-25 DIAGNOSIS — M70.62 TROCHANTERIC BURSITIS OF LEFT HIP: Primary | ICD-10-CM

## 2023-10-25 PROCEDURE — 99213 OFFICE O/P EST LOW 20 MIN: CPT | Performed by: STUDENT IN AN ORGANIZED HEALTH CARE EDUCATION/TRAINING PROGRAM

## 2023-10-25 PROCEDURE — 1036F TOBACCO NON-USER: CPT | Performed by: STUDENT IN AN ORGANIZED HEALTH CARE EDUCATION/TRAINING PROGRAM

## 2023-10-25 ASSESSMENT — PAIN - FUNCTIONAL ASSESSMENT: PAIN_FUNCTIONAL_ASSESSMENT: NO/DENIES PAIN

## 2023-10-25 NOTE — PROGRESS NOTES
Chief Complaint   Patient presents with    Left Hip - Pain     1. Left Hip trochanteric bursitis.   2. Left hip lipoma   S/P- Cortisone inj 9/13/23        History of Present Illness   53-year-old female with history of left hip trochanteric bursitis/left hip lipoma presents today for evaluation.  States that prior cortisone injection 9/13/2023 provided her significant relief doing very well no issues.  Going to see her rheumatologist for ankylosing spondylitis.  Ambulating independently.  Happy with her results regarding injection     Review of Systems   GENERAL: Negative for malaise, significant weight loss, fever  MUSCULOSKELETAL: see HPI  NEURO:  Negative     Physical Exam  General: No acute distress, alert and oriented x3  Left hip:  Normal gait  Mild Trendelenburg sign  Skin healthy and intact  No tenderness to palpation over lumbar spine  Mild tenderness over greater trochanter     Full forward flexion  Symmetric motion, no loss of internal rotation   No weakness with resisted hip flexion, abduction or adduction     Negative flexion/adduction/internal rotation  Negative flexion/abduction/external rotation test  Negative straight leg raise  Neurovascular exam normal distally       Imaging  No results found.       Assessment   53-year-old female with resolved left hip trochanteric bursitis     Plan  No orders of the defined types were placed in this encounter.       Range of motion as tolerated tibias as tolerated using pain as a guide  Follow-up as needed  If patient has return of pain and discomfort she will return to clinic for repeat discussion of cortisone injection.    Does have a lipoma about the lateral aspect of the hip we will continue to treat this conservatively however if it does enlarge in size or becomes painful she will return to clinic for repeat evaluation.

## 2023-11-16 ENCOUNTER — TELEMEDICINE (OUTPATIENT)
Dept: PRIMARY CARE | Facility: CLINIC | Age: 53
End: 2023-11-16
Payer: COMMERCIAL

## 2023-11-16 DIAGNOSIS — R76.8 ANTI-RNP ANTIBODIES PRESENT: ICD-10-CM

## 2023-11-16 DIAGNOSIS — M24.60 ANKYLOSIS: ICD-10-CM

## 2023-11-16 DIAGNOSIS — J06.9 UPPER RESPIRATORY TRACT INFECTION, UNSPECIFIED TYPE: Primary | ICD-10-CM

## 2023-11-16 DIAGNOSIS — G43.009 MIGRAINE WITHOUT AURA AND WITHOUT STATUS MIGRAINOSUS, NOT INTRACTABLE: ICD-10-CM

## 2023-11-16 PROBLEM — E03.9 HYPOTHYROIDISM: Status: RESOLVED | Noted: 2023-03-13 | Resolved: 2023-11-16

## 2023-11-16 PROBLEM — G89.18 POST-OPERATIVE PAIN: Status: RESOLVED | Noted: 2023-03-13 | Resolved: 2023-11-16

## 2023-11-16 PROBLEM — D50.0 IRON DEFICIENCY ANEMIA DUE TO CHRONIC BLOOD LOSS: Status: RESOLVED | Noted: 2023-03-21 | Resolved: 2023-11-16

## 2023-11-16 PROBLEM — M79.7 FIBROMYALGIA: Status: RESOLVED | Noted: 2023-03-13 | Resolved: 2023-11-16

## 2023-11-16 PROBLEM — E78.5 HYPERLIPIDEMIA: Status: RESOLVED | Noted: 2023-03-13 | Resolved: 2023-11-16

## 2023-11-16 PROBLEM — L50.9 URTICARIA OF ENTIRE BODY: Status: RESOLVED | Noted: 2023-05-19 | Resolved: 2023-11-16

## 2023-11-16 PROBLEM — L23.7 POISON IVY: Status: RESOLVED | Noted: 2023-05-19 | Resolved: 2023-11-16

## 2023-11-16 PROCEDURE — 99213 OFFICE O/P EST LOW 20 MIN: CPT | Performed by: INTERNAL MEDICINE

## 2023-11-16 RX ORDER — AMOXICILLIN AND CLAVULANATE POTASSIUM 500; 125 MG/1; MG/1
500 TABLET, FILM COATED ORAL 3 TIMES DAILY
Qty: 42 TABLET | Refills: 0 | Status: SHIPPED | OUTPATIENT
Start: 2023-11-16 | End: 2023-11-30

## 2023-11-16 RX ORDER — METHYLPREDNISOLONE 4 MG/1
TABLET ORAL
Qty: 21 TABLET | Refills: 0 | Status: SHIPPED | OUTPATIENT
Start: 2023-11-16 | End: 2023-11-23

## 2023-11-16 NOTE — PROGRESS NOTES
Subjective   Patient ID: Deidra Chacon is a 53 y.o. female who presents for No chief complaint on file..    Assessment/Plan     Problem List Items Addressed This Visit       Migraine, unspecified, not intractable, without status migrainosus    Ankylosis    Upper respiratory tract infection - Primary    Anti-RNP antibodies present     Patient was evaluated today, problem list was reviewed, problems and concerns addressed, Rx list reviewed and updated, lab and tests were noted and reviewed. Life style changes were discussed, always it works better if we eat plant based diet and plenty of fibres and roughage. Consume adequate amount of water and avoid alcohol, light to moderate physical activities and stress reduction are always beneficial for ongoing physical well being. Do not forget to have 6 to 7 hours of sleep regularly and avoid late night jeremy screen exposure.    HPI  53-year-old patient have ankylosing spondylosis autoimmune disease hypothyroidism immunocompromised host complaining of sinus congestion fever chills headache onset acutely duration 3 days progressed acutely aggravating factor change of the weather immunocompromise host advised to get a COVID flu RSV test  has same problem patient allergy to Zithromax advised to get up Medrol Dosepak and amoxicillin's vitamin C vitamin D probiotic and follow-up  Past Medical History:   Diagnosis Date    Candidiasis, unspecified 08/06/2020    Yeast infection    Cough, unspecified 08/01/2016    Cough    Inflammatory polyarthropathy (CMS/HCC) 09/12/2019    Polyarthritis, inflammatory    Other specified complications of surgical and medical care, not elsewhere classified, initial encounter 12/02/2021    Fluid collection at surgical site    Otitis media, unspecified, unspecified ear 01/12/2016    Ear infection    Pain in left hip 05/22/2018    Left hip pain    Pain in right knee 09/21/2016    Right knee pain    Pain in right shoulder 09/12/2019    Right shoulder  pain, unspecified chronicity    Pelvic and perineal pain 10/14/2021    Female pelvic pain    Personal history of diseases of the blood and blood-forming organs and certain disorders involving the immune mechanism 09/12/2019    History of autoimmune disorder    Personal history of other diseases of the digestive system 12/02/2021    History of umbilical hernia    Personal history of other diseases of the digestive system 12/10/2020    History of hemorrhoids    Personal history of other diseases of the musculoskeletal system and connective tissue 06/17/2019    History of low back pain    Personal history of other diseases of the musculoskeletal system and connective tissue 06/23/2016    History of bursitis    Personal history of other diseases of the nervous system and sense organs 09/02/2021    History of carpal tunnel syndrome    Personal history of other endocrine, nutritional and metabolic disease 12/17/2019    History of vitamin D deficiency    Personal history of other mental and behavioral disorders 07/27/2020    History of depression    Personal history of other specified conditions     History of abdominal pain    Personal history of urinary (tract) infections     History of urinary tract infection    Radiculopathy, cervical region 09/09/2019    Cervical radiculopathy    Radiculopathy, lumbar region 06/17/2019    Lumbar radiculopathy    Radiculopathy, lumbar region 09/09/2019    Chronic lumbar radiculopathy    Spinal stenosis, lumbar region without neurogenic claudication 01/19/2021    Spinal stenosis of lumbar region without neurogenic claudication    Unspecified urinary incontinence 10/07/2021    Urinary incontinence in female    Urge incontinence 10/07/2021    Urge incontinence     Past Surgical History:   Procedure Laterality Date    HYSTERECTOMY  08/01/2016    Hysterectomy    OTHER SURGICAL HISTORY  10/07/2021    Carpal tunnel surgery    OTHER SURGICAL HISTORY  10/07/2021    Vaginal sling procedure     OTHER SURGICAL HISTORY  12/02/2021    Umbilical hernia repair laparoscopic    SINUS SURGERY  08/01/2016    Sinus Surgery     Allergies   Allergen Reactions    Azithromycin Unknown    Cephalexin Other     yeast infections    Oseltamivir Itching and Rash     Current Outpatient Medications   Medication Sig Dispense Refill    DULoxetine (Cymbalta) 30 mg DR capsule Take 1 capsule (30 mg) by mouth once daily in the evening. 90 capsule 3    ergocalciferol (Vitamin D-2) 1.25 MG (22805 UT) capsule Take 1 capsule (1,250 mcg) by mouth.      levothyroxine (Synthroid, Levoxyl) 50 mcg tablet take one tablet (50mcg) by mouth once daily 90 tablet 0     No current facility-administered medications for this visit.     Family History   Problem Relation Name Age of Onset    COPD Mother          severe    Other (cardiac disorder) Father      Diabetes Father      Migraines Father      Cancer Maternal Grandmother      Cancer Maternal Grandfather      Heart failure Paternal Grandmother      Heart attack Paternal Grandfather       Social History     Socioeconomic History    Marital status:      Spouse name: None    Number of children: None    Years of education: None    Highest education level: None   Occupational History    None   Tobacco Use    Smoking status: Never    Smokeless tobacco: Never   Substance and Sexual Activity    Alcohol use: Yes     Comment: occasional    Drug use: Never    Sexual activity: None   Other Topics Concern    None   Social History Narrative    None     Social Determinants of Health     Financial Resource Strain: Not on file   Food Insecurity: Not on file   Transportation Needs: Not on file   Physical Activity: Not on file   Stress: Not on file   Social Connections: Not on file   Intimate Partner Violence: Not on file   Housing Stability: Not on file     Immunization History   Administered Date(s) Administered    Influenza, seasonal, injectable 10/25/2022    Moderna SARS-CoV-2 Vaccination 03/18/2021,  03/18/2021, 04/15/2021, 04/15/2021, 01/06/2022    Pneumococcal polysaccharide vaccine, 23-valent, age 2 years and older (PNEUMOVAX 23) 09/09/2019    Tdap vaccine, age 7 year and older (BOOSTRIX) 02/07/2017    Zoster, Unspecified 02/07/2020, 06/07/2020       Review of Systems  Review of systems is otherwise negative unless stated above or in history of present illness.    Objective   Visit Vitals  Smoking Status Never     Physical Exam  .Doxy  This visit was completed via video audio relation to  covid 19 pandemic all issues as below that discuss and address but no physical exam was performed if it was felt that patient should be evaluated in clinic and they have been advised to follow . Patient verbally consented to visit and spent  more than 50% discuss about patient's complaint of problem and plan        No visits with results within 4 Month(s) from this visit.   Latest known visit with results is:   Lab on 05/19/2023   Component Date Value Ref Range Status    TSH 05/19/2023 1.94  0.44 - 3.98 mIU/L Final    WBC 05/19/2023 9.2  4.4 - 11.3 x10E9/L Final    nRBC 05/19/2023 0.0  0.0 - 0.0 /100 WBC Final    RBC 05/19/2023 4.36  4.00 - 5.20 x10E12/L Final    Hemoglobin 05/19/2023 12.4  12.0 - 16.0 g/dL Final    Hematocrit 05/19/2023 39.9  36.0 - 46.0 % Final    MCV 05/19/2023 92  80 - 100 fL Final    MCHC 05/19/2023 31.1 (L)  32.0 - 36.0 g/dL Final    Platelets 05/19/2023 401  150 - 450 x10E9/L Final    RDW 05/19/2023 14.0  11.5 - 14.5 % Final    Neutrophils % 05/19/2023 77.4  40.0 - 80.0 % Final    Immature Granulocytes %, Automated 05/19/2023 0.1  0.0 - 0.9 % Final    Lymphocytes % 05/19/2023 16.9  13.0 - 44.0 % Final    Monocytes % 05/19/2023 5.3  2.0 - 10.0 % Final    Eosinophils % 05/19/2023 0.1  0.0 - 6.0 % Final    Basophils % 05/19/2023 0.2  0.0 - 2.0 % Final    Neutrophils Absolute 05/19/2023 7.14  1.20 - 7.70 x10E9/L Final    Lymphocytes Absolute 05/19/2023 1.56  1.20 - 4.80 x10E9/L Final    Monocytes Absolute  05/19/2023 0.49  0.10 - 1.00 x10E9/L Final    Eosinophils Absolute 05/19/2023 0.01  0.00 - 0.70 x10E9/L Final    Basophils Absolute 05/19/2023 0.02  0.00 - 0.10 x10E9/L Final    Glucose 05/19/2023 88  74 - 99 mg/dL Final    Sodium 05/19/2023 138  136 - 145 mmol/L Final    Potassium 05/19/2023 4.3  3.5 - 5.3 mmol/L Final    Chloride 05/19/2023 102  98 - 107 mmol/L Final    Bicarbonate 05/19/2023 28  21 - 32 mmol/L Final    Anion Gap 05/19/2023 12  10 - 20 mmol/L Final    Urea Nitrogen 05/19/2023 14  6 - 23 mg/dL Final    Creatinine 05/19/2023 0.68  0.50 - 1.05 mg/dL Final    GFR Female 05/19/2023 >90  >90 mL/min/1.73m2 Final    Calcium 05/19/2023 9.6  8.6 - 10.6 mg/dL Final    Albumin 05/19/2023 4.3  3.4 - 5.0 g/dL Final    Alkaline Phosphatase 05/19/2023 76  33 - 110 U/L Final    Total Protein 05/19/2023 7.2  6.4 - 8.2 g/dL Final    AST 05/19/2023 15  9 - 39 U/L Final    Total Bilirubin 05/19/2023 0.3  0.0 - 1.2 mg/dL Final    ALT (SGPT) 05/19/2023 12  7 - 45 U/L Final       Radiology: Reviewed imaging in powerchart.  No results found.      Charting was completed using voice recognition technology and may include unintended errors.

## 2023-11-29 ENCOUNTER — TELEPHONE (OUTPATIENT)
Dept: PRIMARY CARE | Facility: CLINIC | Age: 53
End: 2023-11-29
Payer: COMMERCIAL

## 2023-11-29 DIAGNOSIS — B37.9 YEAST INFECTION: ICD-10-CM

## 2023-11-29 NOTE — TELEPHONE ENCOUNTER
PT finished antibiotics and believes she has a yeast infection.  She is wondering if the Dr could call something in.    I tried to offer her a virtual with Dr Rock, but she only wanted to ask vs an appointment .

## 2023-11-30 RX ORDER — FLUCONAZOLE 100 MG/1
100 TABLET ORAL DAILY
Qty: 2 TABLET | Refills: 0 | Status: SHIPPED | OUTPATIENT
Start: 2023-11-30 | End: 2024-03-11 | Stop reason: ALTCHOICE

## 2023-12-14 DIAGNOSIS — E03.9 ACQUIRED HYPOTHYROIDISM: ICD-10-CM

## 2023-12-14 RX ORDER — LEVOTHYROXINE SODIUM 50 UG/1
TABLET ORAL
Qty: 90 TABLET | Refills: 0 | Status: SHIPPED | OUTPATIENT
Start: 2023-12-14 | End: 2024-04-02 | Stop reason: SDUPTHER

## 2023-12-20 ENCOUNTER — LAB (OUTPATIENT)
Dept: LAB | Facility: LAB | Age: 53
End: 2023-12-20
Payer: COMMERCIAL

## 2023-12-20 DIAGNOSIS — E03.9 ACQUIRED HYPOTHYROIDISM: ICD-10-CM

## 2023-12-20 LAB — TSH SERPL-ACNC: 2.02 MIU/L (ref 0.44–3.98)

## 2023-12-20 PROCEDURE — 84443 ASSAY THYROID STIM HORMONE: CPT

## 2023-12-20 PROCEDURE — 36415 COLL VENOUS BLD VENIPUNCTURE: CPT

## 2024-01-04 DIAGNOSIS — E78.2 MIXED HYPERLIPIDEMIA: ICD-10-CM

## 2024-01-04 RX ORDER — ATORVASTATIN CALCIUM 10 MG/1
10 TABLET, FILM COATED ORAL DAILY
Qty: 90 TABLET | Refills: 3 | Status: SHIPPED | OUTPATIENT
Start: 2024-01-04

## 2024-01-04 RX ORDER — ATORVASTATIN CALCIUM 10 MG/1
1 TABLET, FILM COATED ORAL DAILY
COMMUNITY
Start: 2019-09-12 | End: 2024-01-04 | Stop reason: SDUPTHER

## 2024-02-14 ENCOUNTER — OFFICE VISIT (OUTPATIENT)
Dept: ORTHOPEDIC SURGERY | Facility: CLINIC | Age: 54
End: 2024-02-14
Payer: COMMERCIAL

## 2024-02-14 DIAGNOSIS — M70.62 TROCHANTERIC BURSITIS OF LEFT HIP: ICD-10-CM

## 2024-02-14 PROCEDURE — 1036F TOBACCO NON-USER: CPT | Performed by: STUDENT IN AN ORGANIZED HEALTH CARE EDUCATION/TRAINING PROGRAM

## 2024-02-14 PROCEDURE — 99214 OFFICE O/P EST MOD 30 MIN: CPT | Performed by: STUDENT IN AN ORGANIZED HEALTH CARE EDUCATION/TRAINING PROGRAM

## 2024-02-14 PROCEDURE — 99214 OFFICE O/P EST MOD 30 MIN: CPT | Mod: 57 | Performed by: STUDENT IN AN ORGANIZED HEALTH CARE EDUCATION/TRAINING PROGRAM

## 2024-02-14 RX ORDER — CELECOXIB 200 MG/1
200 CAPSULE ORAL AS NEEDED
COMMUNITY
Start: 2021-07-06

## 2024-02-14 RX ORDER — HYDROCODONE BITARTRATE AND ACETAMINOPHEN 5; 325 MG/1; MG/1
1 TABLET ORAL EVERY 6 HOURS PRN
Qty: 21 TABLET | Refills: 0 | Status: SHIPPED | OUTPATIENT
Start: 2024-02-14 | End: 2024-03-11 | Stop reason: ALTCHOICE

## 2024-02-14 RX ORDER — HYDROXYCHLOROQUINE SULFATE 200 MG/1
200 TABLET ORAL DAILY
COMMUNITY

## 2024-02-14 ASSESSMENT — PAIN SCALES - GENERAL: PAINLEVEL_OUTOF10: 2

## 2024-02-14 ASSESSMENT — PAIN - FUNCTIONAL ASSESSMENT: PAIN_FUNCTIONAL_ASSESSMENT: 0-10

## 2024-02-14 NOTE — PROGRESS NOTES
Chief Complaint   Patient presents with    Left Hip - Follow-up     1. Left Hip trochanteric bursitis.   2. Left hip lipoma   S/p inj 9/13/23  2/10 pain, better         HPI  Patient presents today for follow up of her left hip.  Continues to have laterally based hip pain has had a history of multiple injections about the lateral aspect of her hip for known history of trochanteric bursitis also has a history of a lipoma about the lateral aspect of the hip that bothers her quite a bit.  Patient states that the lipoma is growing in size and is quite bothersome with clothing wear as well as sleeping at night.  Presents today in hopes of proceeding with removal.      Physical exam  General: Alert and oriented to place, person, and time.  No acute distress and breathing comfortably; pleasant and cooperative with the examination.  Extremity:  Left hip: Palpable lipoma approximately 3 cm in size mobile supple and nature overlying skin is intact  Normal gait  Negative Trendelenburg sign  Skin healthy and intact  No tenderness to palpation over lumbar spine  Marked tenderness over greater trochanter     Full forward flexion  Symmetric internal, external rotation   No weakness with resisted hip abduction or adduction     Negative flexion/adduction/internal rotation  Negative flexion/abduction/external rotation test  Negative straight leg raise  Neurovascular exam normal distally    Diagnostics:  Narrative & Impression   Interpreted By:  NURY LAL MD and FERNANDO ESPINOZA MD  MRN: [id number]  Patient Name: CHIQUITA CUELLAR     STUDY:  MRI HIPS W/O-W CONTRAST;  8/1/2023 5:37 pm     INDICATION:  Palpable left lateral hip mass. This is a repeat study as prior MRI  07/10/2023 reportedly did not fully include the area of concern.     COMPARISON:  MRI hip 07/10/2023.     ACCESSION NUMBER(S):  [id number]     ORDERING CLINICIAN:  EDUIN MCKEON     TECHNIQUE:  MR imaging of the  left pelviswas obtained  both before and after  the  intravenous administration of 14 mL Dotarem gadolinium based contrast.     FINDINGS:  Skin markers were placed by the patient in the region of concern  which are seen at the lateral aspect of the left proximal femur in  the subtrochanteric region. No mass or abnormal signal intensity is  identified deep to the skin marker. There is similar appearance of a  circular area of T2 hyperintensity and enhancement within the  subcutaneous fat of the posterior left hip/buttock with central fat  intensity, located posterior to the gluteus arlene muscle and which  is removed from the skin marker. No suspicious soft tissue mass is  identified.     No significant cartilage loss in the left hip. No significant joint  effusion. There is a small focus of fibrocystic change at the  anterosuperior femoral head neck junction there is mild increased  intermediate signal intensity at the insertion of the gluteus medius  and minimus tendons. There is an enthesophyte off of the anterior  aspect of the greater trochanter. There is minimal increased T2  signal intensity in the trochanteric bursa. Visualized left  hemipelvis is unremarkable.     IMPRESSION:  1.  No mass is identified deep to the skin markers placed by the  patient in the area of concern within the lateral left hip.  2. Similar circular area of T2 hyperintensity and enhancement within  the posterior left hip/buttock with central fat intensity, removed  from the skin marker, and which likely represents fat necrosis.  3. Mild insertional gluteus medius and minimus tendinosis with  enthesophyte formation and minimal trochanteric bursitis.          Procedures  Procedures     Assessment:  54-year-old female with left hip lipoma, chronic left hip trochanteric bursitis    Treatment plan:  Constellation of findings discussed with patient in detail risk benefits alternative treatment discussed with patient in detail.  Using shared informed vision making patient wishes to proceed  with excision of lipoma as well as debridement/excision of left hip trochanteric bursa.  I had a long discussion with Don as to nature of current findings.  Discussed that this may or may not relieve her pain and that there is a chance that she has recurrent left hip trochanteric bursitis despite excision.  She is okay with this risk and does wish to proceed.  Will obtain medical clearance prior to proceeding with Dr. Caldera.  Discussed with patient that we will send pathology specimen.    Based on history and physical exam as well as imaging findings recommend surgical intervention to include excision of left hip lipoma, excision of left hip trochanteric bursa.  Risk benefits and alternatives to treatment were discussed.  Particular risks of the surgery include recurrence of pain, presence of malignancy, neurovascular compromise, wound healing complications.  Despite these risks, patient wishes to proceed.  Postoperative restrictions and limitations were reviewed in detail.  Patient will schedule surgery at their earliest convenience.    Patient was given Norco for postoperative pain control.  We will pick this up prior to surgery so that they are not looking for during the day of surgery. I have personally reviewed the OARRS report for this patient. This report is scanned into  the electronic medical record. I have considered the risks of abuse, dependence, addiction, and diversion. They currently report a pain of 8.    Regarding DVT prophylaxis, recommend generalized ambulation    The risks of surgery were discussed including but not limited to the risks of medications given for surgery, the risk of blood loss during and after surgery that can lead to the need for blood products in certain situations, infection, damage to normal structures that can lead to long term problems of pain or dysfunction, wound healing complications, the possibility of nonunion/malunion of any osteotomies and late or chronic pain as  a result of the surgical intervention.  In addition potentially life threatening complications that can occur at the time of surgery and after surgery were discussed including but not limited to deep vein thrombosis, pulmonary embolism, myocardial infarction, stroke and death.      []  All of the patient's questions concerns answered

## 2024-02-25 PROBLEM — D17.24 BENIGN LIPOMATOUS NEOPLASM OF SKIN AND SUBCUTANEOUS TISSUE OF LEFT LEG: Status: ACTIVE | Noted: 2024-03-26

## 2024-02-25 PROBLEM — M70.62 TROCHANTERIC BURSITIS, LEFT HIP: Status: ACTIVE | Noted: 2024-03-26

## 2024-03-11 ENCOUNTER — OFFICE VISIT (OUTPATIENT)
Dept: PRIMARY CARE | Facility: CLINIC | Age: 54
End: 2024-03-11
Payer: COMMERCIAL

## 2024-03-11 VITALS
HEART RATE: 86 BPM | SYSTOLIC BLOOD PRESSURE: 129 MMHG | HEIGHT: 67 IN | TEMPERATURE: 97.5 F | BODY MASS INDEX: 25.82 KG/M2 | WEIGHT: 164.5 LBS | OXYGEN SATURATION: 97 % | DIASTOLIC BLOOD PRESSURE: 80 MMHG

## 2024-03-11 DIAGNOSIS — E78.2 HYPERLIPEMIA, MIXED: ICD-10-CM

## 2024-03-11 DIAGNOSIS — M45.0 ANKYLOSING SPONDYLITIS OF MULTIPLE SITES IN SPINE (MULTI): ICD-10-CM

## 2024-03-11 DIAGNOSIS — M70.62 TROCHANTERIC BURSITIS, LEFT HIP: ICD-10-CM

## 2024-03-11 DIAGNOSIS — E03.9 ACQUIRED HYPOTHYROIDISM: ICD-10-CM

## 2024-03-11 DIAGNOSIS — Z01.810 PREOP CARDIOVASCULAR EXAM: Primary | ICD-10-CM

## 2024-03-11 PROBLEM — M24.60 ANKYLOSIS: Status: RESOLVED | Noted: 2023-03-13 | Resolved: 2024-03-11

## 2024-03-11 PROBLEM — D17.24 BENIGN LIPOMATOUS NEOPLASM OF SKIN AND SUBCUTANEOUS TISSUE OF LEFT LEG: Status: RESOLVED | Noted: 2024-03-26 | Resolved: 2024-03-11

## 2024-03-11 PROBLEM — G43.909 MIGRAINE, UNSPECIFIED, NOT INTRACTABLE, WITHOUT STATUS MIGRAINOSUS: Status: RESOLVED | Noted: 2023-03-13 | Resolved: 2024-03-11

## 2024-03-11 PROBLEM — J06.9 UPPER RESPIRATORY TRACT INFECTION: Status: RESOLVED | Noted: 2023-03-13 | Resolved: 2024-03-11

## 2024-03-11 PROBLEM — R76.8 ANTI-RNP ANTIBODIES PRESENT: Status: RESOLVED | Noted: 2023-03-21 | Resolved: 2024-03-11

## 2024-03-11 PROCEDURE — 1036F TOBACCO NON-USER: CPT | Performed by: INTERNAL MEDICINE

## 2024-03-11 PROCEDURE — 99214 OFFICE O/P EST MOD 30 MIN: CPT | Performed by: INTERNAL MEDICINE

## 2024-03-11 NOTE — PROGRESS NOTES
Subjective   Patient ID: Deidra Chacon is a 54 y.o. female who presents for Pre-op Exam (Removing the Bursa and Lipoma on left hip with  ).    Assessment/Plan   Preadmission testing for removal of bursa and lipoma from the left hip 2024 under general anesthesia at [facility]    Check CBC BMP PT PTT hold NSAID or aspirin fish oil alcohol week before surgery perioperative GI DVT prophylaxis mild to moderate risk for surgery cleared for surgery    Ankylosing spondylolysis follow-up with rheumatologist continue folic acid Plaquenil ophthalmology evaluation    Hyperlipidemia Lipitor 10 mg a day monitor LFT lipid CPK once a year    Hypothyroidism continue levothyroxine 50 mcg a day TSH twice a year    Fibromyalgia continue Cymbalta hold Celebrex before surgery    Osteopenia vitamin C vitamin D calcium supplement    At age of 54 female breast cancer cervical cancer colon cancer screening follow-up  Problem List Items Addressed This Visit       Hyperlipemia, mixed    Acquired hypothyroidism    Trochanteric bursitis, left hip    Ankylosing spondylitis of multiple sites in spine (CMS/HCC)    Preop cardiovascular exam - Primary     Check CBC BMP PT PTT urinalysis EKG    Mild to moderate risk for surgery cleared for surgery    Perioperative GI DVT prophylaxis with Pepcid and Lovenox and Ancef  HPI this is a 54-year-old patient  with 2 children    No brother 1 sister    Mother  from COPD    Father  from the heart problem    Personal history of multiple foot surgery hysterectomy ovarian cyst removal right and left carpal tunnel surgery    Complaining of acute on chronic left hip pain evaluated by orthopedics and rheumatologist underwent for MRI diagnosis of bursa associated with a possible lipoma try the therapy cortisone does not get any better scheduled for elective surgery on     Negative for DVT or PE    Negative for COVID    Negative for febrile illness    Negative for  reaction to anesthesia.  Past Medical History:   Diagnosis Date    Candidiasis, unspecified 08/06/2020    Yeast infection    Cough, unspecified 08/01/2016    Cough    Inflammatory polyarthropathy (CMS/HCC) 09/12/2019    Polyarthritis, inflammatory    Other specified complications of surgical and medical care, not elsewhere classified, initial encounter 12/02/2021    Fluid collection at surgical site    Otitis media, unspecified, unspecified ear 01/12/2016    Ear infection    Pain in left hip 05/22/2018    Left hip pain    Pain in right knee 09/21/2016    Right knee pain    Pain in right shoulder 09/12/2019    Right shoulder pain, unspecified chronicity    Pelvic and perineal pain 10/14/2021    Female pelvic pain    Personal history of diseases of the blood and blood-forming organs and certain disorders involving the immune mechanism 09/12/2019    History of autoimmune disorder    Personal history of other diseases of the digestive system 12/02/2021    History of umbilical hernia    Personal history of other diseases of the digestive system 12/10/2020    History of hemorrhoids    Personal history of other diseases of the musculoskeletal system and connective tissue 06/17/2019    History of low back pain    Personal history of other diseases of the musculoskeletal system and connective tissue 06/23/2016    History of bursitis    Personal history of other diseases of the nervous system and sense organs 09/02/2021    History of carpal tunnel syndrome    Personal history of other endocrine, nutritional and metabolic disease 12/17/2019    History of vitamin D deficiency    Personal history of other mental and behavioral disorders 07/27/2020    History of depression    Personal history of other specified conditions     History of abdominal pain    Personal history of urinary (tract) infections     History of urinary tract infection    Radiculopathy, cervical region 09/09/2019    Cervical radiculopathy    Radiculopathy,  lumbar region 06/17/2019    Lumbar radiculopathy    Radiculopathy, lumbar region 09/09/2019    Chronic lumbar radiculopathy    Spinal stenosis, lumbar region without neurogenic claudication 01/19/2021    Spinal stenosis of lumbar region without neurogenic claudication    Unspecified urinary incontinence 10/07/2021    Urinary incontinence in female    Urge incontinence 10/07/2021    Urge incontinence     Past Surgical History:   Procedure Laterality Date    HYSTERECTOMY  08/01/2016    Hysterectomy    OTHER SURGICAL HISTORY  10/07/2021    Carpal tunnel surgery    OTHER SURGICAL HISTORY  10/07/2021    Vaginal sling procedure    OTHER SURGICAL HISTORY  12/02/2021    Umbilical hernia repair laparoscopic    SINUS SURGERY  08/01/2016    Sinus Surgery     Allergies   Allergen Reactions    Azithromycin Unknown    Cephalexin Other     yeast infections    Oseltamivir Itching and Rash     Current Outpatient Medications   Medication Sig Dispense Refill    atorvastatin (Lipitor) 10 mg tablet Take 1 tablet (10 mg) by mouth once daily. (Patient taking differently: Take 1 tablet (10 mg) by mouth every other day.) 90 tablet 3    CeleBREX 200 mg capsule Take 1 capsule (200 mg) by mouth every other day.      DULoxetine (Cymbalta) 30 mg DR capsule Take 1 capsule (30 mg) by mouth once daily in the evening. 90 capsule 3    ergocalciferol (Vitamin D-2) 1.25 MG (26100 UT) capsule Take 1 capsule (1,250 mcg) by mouth.      levothyroxine (Synthroid, Levoxyl) 50 mcg tablet take one tablet (50mcg) by mouth once daily 90 tablet 0    PlaqueniL 200 mg tablet Take 1 tablet (200 mg) by mouth once daily.       No current facility-administered medications for this visit.     Family History   Problem Relation Name Age of Onset    COPD Mother          severe    Other (cardiac disorder) Father      Diabetes Father      Migraines Father      Cancer Maternal Grandmother      Cancer Maternal Grandfather      Heart failure Paternal Grandmother      Heart  "attack Paternal Grandfather       Social History     Socioeconomic History    Marital status:      Spouse name: None    Number of children: None    Years of education: None    Highest education level: None   Occupational History    None   Tobacco Use    Smoking status: Never    Smokeless tobacco: Never   Substance and Sexual Activity    Alcohol use: Yes     Comment: occasional    Drug use: Never    Sexual activity: None   Other Topics Concern    None   Social History Narrative    None     Social Determinants of Health     Financial Resource Strain: Not on file   Food Insecurity: Not on file   Transportation Needs: Not on file   Physical Activity: Not on file   Stress: Not on file   Social Connections: Not on file   Intimate Partner Violence: Not on file   Housing Stability: Not on file     Immunization History   Administered Date(s) Administered    Influenza, seasonal, injectable 10/25/2022    Moderna SARS-CoV-2 Vaccination 03/18/2021, 03/18/2021, 04/15/2021, 04/15/2021, 01/06/2022    Pneumococcal polysaccharide vaccine, 23-valent, age 2 years and older (PNEUMOVAX 23) 09/09/2019    Tdap vaccine, age 7 year and older (BOOSTRIX, ADACEL) 02/07/2017    Zoster, Unspecified 02/07/2020, 06/07/2020       Review of Systems  Review of systems is otherwise negative unless stated above or in history of present illness.    Objective   Visit Vitals  /80 (BP Location: Left arm, Patient Position: Sitting, BP Cuff Size: Adult)   Pulse 86   Temp 36.4 °C (97.5 °F)   Ht 1.702 m (5' 7\")   Wt 74.6 kg (164 lb 8 oz)   SpO2 97%   BMI 25.76 kg/m²   Smoking Status Never   BSA 1.88 m²     Physical Exam  Constitutional:       General: not in acute distress.   HENT:      Head: Normocephalic and atraumatic.      Nose: Nose normal.   Eyes:      Extraocular Movements: Extraocular movements intact.      Conjunctiva/sclera: Conjunctivae normal.   Cardiovascular:      Rate and Rhythm: Normal rate ,  No M/R/G  Pulmonary:      Effort: " Pulmonary effort is normal.      Breath sounds: Normal, Bilat Equal AE  Skin:     General: Skin is warm.   Neurological: Neuralgia     Mental Status: He is alert and oriented to person, place, and time.   Psychiatric:         Mood and Affect: Mood normal.         Behavior: Behavior normal.   Musculoskeletal cervical and left hip tenderness    Lab on 12/20/2023   Component Date Value Ref Range Status    Thyroid Stimulating Hormone 12/20/2023 2.02  0.44 - 3.98 mIU/L Final       Radiology: Reviewed imaging in powerchart.  No results found.      Charting was completed using voice recognition technology and may include unintended errors.

## 2024-03-11 NOTE — H&P (VIEW-ONLY)
Subjective   Patient ID: Deidra Chacon is a 54 y.o. female who presents for Pre-op Exam (Removing the Bursa and Lipoma on left hip with  ).    Assessment/Plan   Preadmission testing for removal of bursa and lipoma from the left hip 2024 under general anesthesia at [facility]    Check CBC BMP PT PTT hold NSAID or aspirin fish oil alcohol week before surgery perioperative GI DVT prophylaxis mild to moderate risk for surgery cleared for surgery    Ankylosing spondylolysis follow-up with rheumatologist continue folic acid Plaquenil ophthalmology evaluation    Hyperlipidemia Lipitor 10 mg a day monitor LFT lipid CPK once a year    Hypothyroidism continue levothyroxine 50 mcg a day TSH twice a year    Fibromyalgia continue Cymbalta hold Celebrex before surgery    Osteopenia vitamin C vitamin D calcium supplement    At age of 54 female breast cancer cervical cancer colon cancer screening follow-up  Problem List Items Addressed This Visit       Hyperlipemia, mixed    Acquired hypothyroidism    Trochanteric bursitis, left hip    Ankylosing spondylitis of multiple sites in spine (CMS/HCC)    Preop cardiovascular exam - Primary     Check CBC BMP PT PTT urinalysis EKG    Mild to moderate risk for surgery cleared for surgery    Perioperative GI DVT prophylaxis with Pepcid and Lovenox and Ancef  HPI this is a 54-year-old patient  with 2 children    No brother 1 sister    Mother  from COPD    Father  from the heart problem    Personal history of multiple foot surgery hysterectomy ovarian cyst removal right and left carpal tunnel surgery    Complaining of acute on chronic left hip pain evaluated by orthopedics and rheumatologist underwent for MRI diagnosis of bursa associated with a possible lipoma try the therapy cortisone does not get any better scheduled for elective surgery on     Negative for DVT or PE    Negative for COVID    Negative for febrile illness    Negative for  reaction to anesthesia.  Past Medical History:   Diagnosis Date    Candidiasis, unspecified 08/06/2020    Yeast infection    Cough, unspecified 08/01/2016    Cough    Inflammatory polyarthropathy (CMS/HCC) 09/12/2019    Polyarthritis, inflammatory    Other specified complications of surgical and medical care, not elsewhere classified, initial encounter 12/02/2021    Fluid collection at surgical site    Otitis media, unspecified, unspecified ear 01/12/2016    Ear infection    Pain in left hip 05/22/2018    Left hip pain    Pain in right knee 09/21/2016    Right knee pain    Pain in right shoulder 09/12/2019    Right shoulder pain, unspecified chronicity    Pelvic and perineal pain 10/14/2021    Female pelvic pain    Personal history of diseases of the blood and blood-forming organs and certain disorders involving the immune mechanism 09/12/2019    History of autoimmune disorder    Personal history of other diseases of the digestive system 12/02/2021    History of umbilical hernia    Personal history of other diseases of the digestive system 12/10/2020    History of hemorrhoids    Personal history of other diseases of the musculoskeletal system and connective tissue 06/17/2019    History of low back pain    Personal history of other diseases of the musculoskeletal system and connective tissue 06/23/2016    History of bursitis    Personal history of other diseases of the nervous system and sense organs 09/02/2021    History of carpal tunnel syndrome    Personal history of other endocrine, nutritional and metabolic disease 12/17/2019    History of vitamin D deficiency    Personal history of other mental and behavioral disorders 07/27/2020    History of depression    Personal history of other specified conditions     History of abdominal pain    Personal history of urinary (tract) infections     History of urinary tract infection    Radiculopathy, cervical region 09/09/2019    Cervical radiculopathy    Radiculopathy,  lumbar region 06/17/2019    Lumbar radiculopathy    Radiculopathy, lumbar region 09/09/2019    Chronic lumbar radiculopathy    Spinal stenosis, lumbar region without neurogenic claudication 01/19/2021    Spinal stenosis of lumbar region without neurogenic claudication    Unspecified urinary incontinence 10/07/2021    Urinary incontinence in female    Urge incontinence 10/07/2021    Urge incontinence     Past Surgical History:   Procedure Laterality Date    HYSTERECTOMY  08/01/2016    Hysterectomy    OTHER SURGICAL HISTORY  10/07/2021    Carpal tunnel surgery    OTHER SURGICAL HISTORY  10/07/2021    Vaginal sling procedure    OTHER SURGICAL HISTORY  12/02/2021    Umbilical hernia repair laparoscopic    SINUS SURGERY  08/01/2016    Sinus Surgery     Allergies   Allergen Reactions    Azithromycin Unknown    Cephalexin Other     yeast infections    Oseltamivir Itching and Rash     Current Outpatient Medications   Medication Sig Dispense Refill    atorvastatin (Lipitor) 10 mg tablet Take 1 tablet (10 mg) by mouth once daily. (Patient taking differently: Take 1 tablet (10 mg) by mouth every other day.) 90 tablet 3    CeleBREX 200 mg capsule Take 1 capsule (200 mg) by mouth every other day.      DULoxetine (Cymbalta) 30 mg DR capsule Take 1 capsule (30 mg) by mouth once daily in the evening. 90 capsule 3    ergocalciferol (Vitamin D-2) 1.25 MG (98180 UT) capsule Take 1 capsule (1,250 mcg) by mouth.      levothyroxine (Synthroid, Levoxyl) 50 mcg tablet take one tablet (50mcg) by mouth once daily 90 tablet 0    PlaqueniL 200 mg tablet Take 1 tablet (200 mg) by mouth once daily.       No current facility-administered medications for this visit.     Family History   Problem Relation Name Age of Onset    COPD Mother          severe    Other (cardiac disorder) Father      Diabetes Father      Migraines Father      Cancer Maternal Grandmother      Cancer Maternal Grandfather      Heart failure Paternal Grandmother      Heart  "attack Paternal Grandfather       Social History     Socioeconomic History    Marital status:      Spouse name: None    Number of children: None    Years of education: None    Highest education level: None   Occupational History    None   Tobacco Use    Smoking status: Never    Smokeless tobacco: Never   Substance and Sexual Activity    Alcohol use: Yes     Comment: occasional    Drug use: Never    Sexual activity: None   Other Topics Concern    None   Social History Narrative    None     Social Determinants of Health     Financial Resource Strain: Not on file   Food Insecurity: Not on file   Transportation Needs: Not on file   Physical Activity: Not on file   Stress: Not on file   Social Connections: Not on file   Intimate Partner Violence: Not on file   Housing Stability: Not on file     Immunization History   Administered Date(s) Administered    Influenza, seasonal, injectable 10/25/2022    Moderna SARS-CoV-2 Vaccination 03/18/2021, 03/18/2021, 04/15/2021, 04/15/2021, 01/06/2022    Pneumococcal polysaccharide vaccine, 23-valent, age 2 years and older (PNEUMOVAX 23) 09/09/2019    Tdap vaccine, age 7 year and older (BOOSTRIX, ADACEL) 02/07/2017    Zoster, Unspecified 02/07/2020, 06/07/2020       Review of Systems  Review of systems is otherwise negative unless stated above or in history of present illness.    Objective   Visit Vitals  /80 (BP Location: Left arm, Patient Position: Sitting, BP Cuff Size: Adult)   Pulse 86   Temp 36.4 °C (97.5 °F)   Ht 1.702 m (5' 7\")   Wt 74.6 kg (164 lb 8 oz)   SpO2 97%   BMI 25.76 kg/m²   Smoking Status Never   BSA 1.88 m²     Physical Exam  Constitutional:       General: not in acute distress.   HENT:      Head: Normocephalic and atraumatic.      Nose: Nose normal.   Eyes:      Extraocular Movements: Extraocular movements intact.      Conjunctiva/sclera: Conjunctivae normal.   Cardiovascular:      Rate and Rhythm: Normal rate ,  No M/R/G  Pulmonary:      Effort: " Pulmonary effort is normal.      Breath sounds: Normal, Bilat Equal AE  Skin:     General: Skin is warm.   Neurological: Neuralgia     Mental Status: He is alert and oriented to person, place, and time.   Psychiatric:         Mood and Affect: Mood normal.         Behavior: Behavior normal.   Musculoskeletal cervical and left hip tenderness    Lab on 12/20/2023   Component Date Value Ref Range Status    Thyroid Stimulating Hormone 12/20/2023 2.02  0.44 - 3.98 mIU/L Final       Radiology: Reviewed imaging in powerchart.  No results found.      Charting was completed using voice recognition technology and may include unintended errors.

## 2024-03-19 ENCOUNTER — HOSPITAL ENCOUNTER (OUTPATIENT)
Dept: CARDIOLOGY | Facility: HOSPITAL | Age: 54
Discharge: HOME | End: 2024-03-19
Payer: COMMERCIAL

## 2024-03-19 ENCOUNTER — LAB (OUTPATIENT)
Dept: LAB | Facility: HOSPITAL | Age: 54
End: 2024-03-19
Payer: COMMERCIAL

## 2024-03-19 DIAGNOSIS — Z01.818 PRE-OP EXAM: ICD-10-CM

## 2024-03-19 LAB
ALBUMIN SERPL BCP-MCNC: 4.1 G/DL (ref 3.4–5)
ALP SERPL-CCNC: 81 U/L (ref 33–110)
ALT SERPL W P-5'-P-CCNC: 12 U/L (ref 7–45)
ANION GAP SERPL CALC-SCNC: 10 MMOL/L (ref 10–20)
APPEARANCE UR: CLEAR
APTT PPP: 31 SECONDS (ref 27–38)
AST SERPL W P-5'-P-CCNC: 15 U/L (ref 9–39)
BASOPHILS # BLD AUTO: 0.04 X10*3/UL (ref 0–0.1)
BASOPHILS NFR BLD AUTO: 0.6 %
BILIRUB SERPL-MCNC: 0.3 MG/DL (ref 0–1.2)
BILIRUB UR STRIP.AUTO-MCNC: NEGATIVE MG/DL
BUN SERPL-MCNC: 10 MG/DL (ref 6–23)
CALCIUM SERPL-MCNC: 9.1 MG/DL (ref 8.6–10.3)
CHLORIDE SERPL-SCNC: 105 MMOL/L (ref 98–107)
CO2 SERPL-SCNC: 28 MMOL/L (ref 21–32)
COLOR UR: YELLOW
CREAT SERPL-MCNC: 0.66 MG/DL (ref 0.5–1.05)
EGFRCR SERPLBLD CKD-EPI 2021: >90 ML/MIN/1.73M*2
EOSINOPHIL # BLD AUTO: 0.08 X10*3/UL (ref 0–0.7)
EOSINOPHIL NFR BLD AUTO: 1.3 %
ERYTHROCYTE [DISTWIDTH] IN BLOOD BY AUTOMATED COUNT: 14.7 % (ref 11.5–14.5)
GLUCOSE SERPL-MCNC: 65 MG/DL (ref 74–99)
GLUCOSE UR STRIP.AUTO-MCNC: NEGATIVE MG/DL
HCT VFR BLD AUTO: 34.2 % (ref 36–46)
HGB BLD-MCNC: 11.1 G/DL (ref 12–16)
HOLD SPECIMEN: NORMAL
IMM GRANULOCYTES # BLD AUTO: 0.02 X10*3/UL (ref 0–0.7)
IMM GRANULOCYTES NFR BLD AUTO: 0.3 % (ref 0–0.9)
INR PPP: 1 (ref 0.9–1.1)
KETONES UR STRIP.AUTO-MCNC: NEGATIVE MG/DL
LEUKOCYTE ESTERASE UR QL STRIP.AUTO: NEGATIVE
LYMPHOCYTES # BLD AUTO: 1.46 X10*3/UL (ref 1.2–4.8)
LYMPHOCYTES NFR BLD AUTO: 23.4 %
MCH RBC QN AUTO: 28.2 PG (ref 26–34)
MCHC RBC AUTO-ENTMCNC: 32.5 G/DL (ref 32–36)
MCV RBC AUTO: 87 FL (ref 80–100)
MONOCYTES # BLD AUTO: 0.51 X10*3/UL (ref 0.1–1)
MONOCYTES NFR BLD AUTO: 8.2 %
NEUTROPHILS # BLD AUTO: 4.14 X10*3/UL (ref 1.2–7.7)
NEUTROPHILS NFR BLD AUTO: 66.2 %
NITRITE UR QL STRIP.AUTO: NEGATIVE
NRBC BLD-RTO: 0 /100 WBCS (ref 0–0)
PH UR STRIP.AUTO: 6 [PH]
PLATELET # BLD AUTO: 401 X10*3/UL (ref 150–450)
POTASSIUM SERPL-SCNC: 3.7 MMOL/L (ref 3.5–5.3)
PROT SERPL-MCNC: 7.1 G/DL (ref 6.4–8.2)
PROT UR STRIP.AUTO-MCNC: NEGATIVE MG/DL
PROTHROMBIN TIME: 11.7 SECONDS (ref 9.8–12.8)
RBC # BLD AUTO: 3.93 X10*6/UL (ref 4–5.2)
RBC # UR STRIP.AUTO: NEGATIVE /UL
SODIUM SERPL-SCNC: 139 MMOL/L (ref 136–145)
SP GR UR STRIP.AUTO: 1.02
UROBILINOGEN UR STRIP.AUTO-MCNC: <2 MG/DL
WBC # BLD AUTO: 6.3 X10*3/UL (ref 4.4–11.3)

## 2024-03-19 PROCEDURE — 93010 ELECTROCARDIOGRAM REPORT: CPT | Performed by: INTERNAL MEDICINE

## 2024-03-19 PROCEDURE — 81003 URINALYSIS AUTO W/O SCOPE: CPT

## 2024-03-19 PROCEDURE — 36415 COLL VENOUS BLD VENIPUNCTURE: CPT

## 2024-03-19 PROCEDURE — 93005 ELECTROCARDIOGRAM TRACING: CPT

## 2024-03-19 PROCEDURE — 85730 THROMBOPLASTIN TIME PARTIAL: CPT

## 2024-03-19 PROCEDURE — 80053 COMPREHEN METABOLIC PANEL: CPT

## 2024-03-19 PROCEDURE — 85610 PROTHROMBIN TIME: CPT

## 2024-03-19 PROCEDURE — 85025 COMPLETE CBC W/AUTO DIFF WBC: CPT

## 2024-03-20 LAB
ATRIAL RATE: 71 BPM
P AXIS: 65 DEGREES
P OFFSET: 182 MS
P ONSET: 128 MS
PR INTERVAL: 176 MS
Q ONSET: 216 MS
QRS COUNT: 11 BEATS
QRS DURATION: 102 MS
QT INTERVAL: 404 MS
QTC CALCULATION(BAZETT): 439 MS
QTC FREDERICIA: 427 MS
R AXIS: 29 DEGREES
T AXIS: 46 DEGREES
T OFFSET: 418 MS
VENTRICULAR RATE: 71 BPM

## 2024-03-25 PROBLEM — D17.24 BENIGN LIPOMATOUS NEOPLASM OF SKIN AND SUBCUTANEOUS TISSUE OF LEFT LEG: Status: ACTIVE | Noted: 2024-03-26

## 2024-03-26 ENCOUNTER — ANESTHESIA EVENT (OUTPATIENT)
Dept: OPERATING ROOM | Facility: HOSPITAL | Age: 54
End: 2024-03-26
Payer: COMMERCIAL

## 2024-03-26 ENCOUNTER — ANESTHESIA (OUTPATIENT)
Dept: OPERATING ROOM | Facility: HOSPITAL | Age: 54
End: 2024-03-26
Payer: COMMERCIAL

## 2024-03-26 ENCOUNTER — HOSPITAL ENCOUNTER (OUTPATIENT)
Facility: HOSPITAL | Age: 54
Setting detail: OUTPATIENT SURGERY
Discharge: HOME | End: 2024-03-26
Attending: STUDENT IN AN ORGANIZED HEALTH CARE EDUCATION/TRAINING PROGRAM | Admitting: STUDENT IN AN ORGANIZED HEALTH CARE EDUCATION/TRAINING PROGRAM
Payer: COMMERCIAL

## 2024-03-26 VITALS
WEIGHT: 156.75 LBS | HEIGHT: 67 IN | BODY MASS INDEX: 24.6 KG/M2 | OXYGEN SATURATION: 95 % | SYSTOLIC BLOOD PRESSURE: 110 MMHG | DIASTOLIC BLOOD PRESSURE: 67 MMHG | RESPIRATION RATE: 16 BRPM | HEART RATE: 81 BPM | TEMPERATURE: 98.1 F

## 2024-03-26 DIAGNOSIS — M70.62 TROCHANTERIC BURSITIS, LEFT HIP: ICD-10-CM

## 2024-03-26 DIAGNOSIS — D17.24 BENIGN LIPOMATOUS NEOPLASM OF SKIN AND SUBCUTANEOUS TISSUE OF LEFT LEG: ICD-10-CM

## 2024-03-26 PROCEDURE — 7100000001 HC RECOVERY ROOM TIME - INITIAL BASE CHARGE: Performed by: STUDENT IN AN ORGANIZED HEALTH CARE EDUCATION/TRAINING PROGRAM

## 2024-03-26 PROCEDURE — 2500000005 HC RX 250 GENERAL PHARMACY W/O HCPCS: Performed by: STUDENT IN AN ORGANIZED HEALTH CARE EDUCATION/TRAINING PROGRAM

## 2024-03-26 PROCEDURE — 88304 TISSUE EXAM BY PATHOLOGIST: CPT | Mod: TC,ELYLAB | Performed by: STUDENT IN AN ORGANIZED HEALTH CARE EDUCATION/TRAINING PROGRAM

## 2024-03-26 PROCEDURE — A6213 FOAM DRG >16<=48 SQ IN W/BDR: HCPCS | Performed by: STUDENT IN AN ORGANIZED HEALTH CARE EDUCATION/TRAINING PROGRAM

## 2024-03-26 PROCEDURE — 7100000010 HC PHASE TWO TIME - EACH INCREMENTAL 1 MINUTE: Performed by: STUDENT IN AN ORGANIZED HEALTH CARE EDUCATION/TRAINING PROGRAM

## 2024-03-26 PROCEDURE — 2500000004 HC RX 250 GENERAL PHARMACY W/ HCPCS (ALT 636 FOR OP/ED): Performed by: STUDENT IN AN ORGANIZED HEALTH CARE EDUCATION/TRAINING PROGRAM

## 2024-03-26 PROCEDURE — 3600000003 HC OR TIME - INITIAL BASE CHARGE - PROCEDURE LEVEL THREE: Performed by: STUDENT IN AN ORGANIZED HEALTH CARE EDUCATION/TRAINING PROGRAM

## 2024-03-26 PROCEDURE — 2500000001 HC RX 250 WO HCPCS SELF ADMINISTERED DRUGS (ALT 637 FOR MEDICARE OP): Performed by: STUDENT IN AN ORGANIZED HEALTH CARE EDUCATION/TRAINING PROGRAM

## 2024-03-26 PROCEDURE — 88304 TISSUE EXAM BY PATHOLOGIST: CPT | Performed by: PATHOLOGY

## 2024-03-26 PROCEDURE — 3700000002 HC GENERAL ANESTHESIA TIME - EACH INCREMENTAL 1 MINUTE: Performed by: STUDENT IN AN ORGANIZED HEALTH CARE EDUCATION/TRAINING PROGRAM

## 2024-03-26 PROCEDURE — 7100000002 HC RECOVERY ROOM TIME - EACH INCREMENTAL 1 MINUTE: Performed by: STUDENT IN AN ORGANIZED HEALTH CARE EDUCATION/TRAINING PROGRAM

## 2024-03-26 PROCEDURE — 2720000007 HC OR 272 NO HCPCS: Performed by: STUDENT IN AN ORGANIZED HEALTH CARE EDUCATION/TRAINING PROGRAM

## 2024-03-26 PROCEDURE — 3600000008 HC OR TIME - EACH INCREMENTAL 1 MINUTE - PROCEDURE LEVEL THREE: Performed by: STUDENT IN AN ORGANIZED HEALTH CARE EDUCATION/TRAINING PROGRAM

## 2024-03-26 PROCEDURE — 27043 EXC HIP PELVIS LES SC 3 CM/>: CPT | Performed by: STUDENT IN AN ORGANIZED HEALTH CARE EDUCATION/TRAINING PROGRAM

## 2024-03-26 PROCEDURE — 27062 REMOVE FEMUR LESION/BURSA: CPT | Performed by: STUDENT IN AN ORGANIZED HEALTH CARE EDUCATION/TRAINING PROGRAM

## 2024-03-26 PROCEDURE — 7100000009 HC PHASE TWO TIME - INITIAL BASE CHARGE: Performed by: STUDENT IN AN ORGANIZED HEALTH CARE EDUCATION/TRAINING PROGRAM

## 2024-03-26 PROCEDURE — 3700000001 HC GENERAL ANESTHESIA TIME - INITIAL BASE CHARGE: Performed by: STUDENT IN AN ORGANIZED HEALTH CARE EDUCATION/TRAINING PROGRAM

## 2024-03-26 RX ORDER — ONDANSETRON HYDROCHLORIDE 2 MG/ML
4 INJECTION, SOLUTION INTRAVENOUS ONCE AS NEEDED
Status: DISCONTINUED | OUTPATIENT
Start: 2024-03-26 | End: 2024-03-26 | Stop reason: HOSPADM

## 2024-03-26 RX ORDER — PROPOFOL 10 MG/ML
INJECTION, EMULSION INTRAVENOUS AS NEEDED
Status: DISCONTINUED | OUTPATIENT
Start: 2024-03-26 | End: 2024-03-26

## 2024-03-26 RX ORDER — FENTANYL CITRATE 50 UG/ML
25 INJECTION, SOLUTION INTRAMUSCULAR; INTRAVENOUS EVERY 5 MIN PRN
Status: DISCONTINUED | OUTPATIENT
Start: 2024-03-26 | End: 2024-03-26 | Stop reason: HOSPADM

## 2024-03-26 RX ORDER — ROCURONIUM BROMIDE 10 MG/ML
INJECTION, SOLUTION INTRAVENOUS AS NEEDED
Status: DISCONTINUED | OUTPATIENT
Start: 2024-03-26 | End: 2024-03-26

## 2024-03-26 RX ORDER — VANCOMYCIN HYDROCHLORIDE 1 G/20ML
INJECTION, POWDER, LYOPHILIZED, FOR SOLUTION INTRAVENOUS AS NEEDED
Status: DISCONTINUED | OUTPATIENT
Start: 2024-03-26 | End: 2024-03-26 | Stop reason: HOSPADM

## 2024-03-26 RX ORDER — LIDOCAINE HYDROCHLORIDE 20 MG/ML
INJECTION, SOLUTION INFILTRATION; PERINEURAL AS NEEDED
Status: DISCONTINUED | OUTPATIENT
Start: 2024-03-26 | End: 2024-03-26

## 2024-03-26 RX ORDER — SODIUM CHLORIDE, SODIUM LACTATE, POTASSIUM CHLORIDE, CALCIUM CHLORIDE 600; 310; 30; 20 MG/100ML; MG/100ML; MG/100ML; MG/100ML
100 INJECTION, SOLUTION INTRAVENOUS CONTINUOUS
Status: DISCONTINUED | OUTPATIENT
Start: 2024-03-26 | End: 2024-03-26 | Stop reason: HOSPADM

## 2024-03-26 RX ORDER — CEFAZOLIN SODIUM 2 G/100ML
2 INJECTION, SOLUTION INTRAVENOUS ONCE
Status: COMPLETED | OUTPATIENT
Start: 2024-03-26 | End: 2024-03-26

## 2024-03-26 RX ORDER — FENTANYL CITRATE 50 UG/ML
INJECTION, SOLUTION INTRAMUSCULAR; INTRAVENOUS AS NEEDED
Status: DISCONTINUED | OUTPATIENT
Start: 2024-03-26 | End: 2024-03-26

## 2024-03-26 RX ORDER — LABETALOL HYDROCHLORIDE 5 MG/ML
5 INJECTION, SOLUTION INTRAVENOUS ONCE AS NEEDED
Status: DISCONTINUED | OUTPATIENT
Start: 2024-03-26 | End: 2024-03-26 | Stop reason: HOSPADM

## 2024-03-26 RX ORDER — ONDANSETRON HYDROCHLORIDE 2 MG/ML
INJECTION, SOLUTION INTRAVENOUS AS NEEDED
Status: DISCONTINUED | OUTPATIENT
Start: 2024-03-26 | End: 2024-03-26

## 2024-03-26 RX ORDER — MIDAZOLAM HYDROCHLORIDE 1 MG/ML
INJECTION, SOLUTION INTRAMUSCULAR; INTRAVENOUS AS NEEDED
Status: DISCONTINUED | OUTPATIENT
Start: 2024-03-26 | End: 2024-03-26

## 2024-03-26 RX ORDER — BUPIVACAINE HYDROCHLORIDE 2.5 MG/ML
INJECTION, SOLUTION EPIDURAL; INFILTRATION; INTRACAUDAL AS NEEDED
Status: DISCONTINUED | OUTPATIENT
Start: 2024-03-26 | End: 2024-03-26 | Stop reason: HOSPADM

## 2024-03-26 RX ORDER — OXYCODONE HYDROCHLORIDE 5 MG/1
5 TABLET ORAL EVERY 4 HOURS PRN
Status: DISCONTINUED | OUTPATIENT
Start: 2024-03-26 | End: 2024-03-26 | Stop reason: HOSPADM

## 2024-03-26 RX ADMIN — HYDROMORPHONE HYDROCHLORIDE 0.5 MG: 1 INJECTION, SOLUTION INTRAMUSCULAR; INTRAVENOUS; SUBCUTANEOUS at 12:59

## 2024-03-26 RX ADMIN — DEXAMETHASONE SODIUM PHOSPHATE 8 MG: 4 INJECTION, SOLUTION INTRAMUSCULAR; INTRAVENOUS at 11:32

## 2024-03-26 RX ADMIN — CEFAZOLIN SODIUM 2 G: 2 INJECTION, SOLUTION INTRAVENOUS at 11:32

## 2024-03-26 RX ADMIN — FENTANYL CITRATE 50 MCG: 50 INJECTION, SOLUTION INTRAMUSCULAR; INTRAVENOUS at 11:50

## 2024-03-26 RX ADMIN — PROPOFOL 200 MG: 10 INJECTION, EMULSION INTRAVENOUS at 11:26

## 2024-03-26 RX ADMIN — ROCURONIUM BROMIDE 50 MG: 10 SOLUTION INTRAVENOUS at 11:26

## 2024-03-26 RX ADMIN — SODIUM CHLORIDE, POTASSIUM CHLORIDE, SODIUM LACTATE AND CALCIUM CHLORIDE 100 ML/HR: 600; 310; 30; 20 INJECTION, SOLUTION INTRAVENOUS at 10:22

## 2024-03-26 RX ADMIN — OXYCODONE HYDROCHLORIDE 5 MG: 5 TABLET ORAL at 13:10

## 2024-03-26 RX ADMIN — FENTANYL CITRATE 50 MCG: 50 INJECTION, SOLUTION INTRAMUSCULAR; INTRAVENOUS at 11:26

## 2024-03-26 RX ADMIN — LIDOCAINE HYDROCHLORIDE 60 MG: 20 INJECTION, SOLUTION INFILTRATION; PERINEURAL at 11:26

## 2024-03-26 RX ADMIN — HYDROMORPHONE HYDROCHLORIDE 0.5 MG: 1 INJECTION, SOLUTION INTRAMUSCULAR; INTRAVENOUS; SUBCUTANEOUS at 12:47

## 2024-03-26 RX ADMIN — SUGAMMADEX 200 MG: 100 INJECTION, SOLUTION INTRAVENOUS at 12:35

## 2024-03-26 RX ADMIN — MIDAZOLAM 2 MG: 1 INJECTION INTRAMUSCULAR; INTRAVENOUS at 11:21

## 2024-03-26 RX ADMIN — ONDANSETRON 4 MG: 2 INJECTION, SOLUTION INTRAMUSCULAR; INTRAVENOUS at 12:17

## 2024-03-26 RX ADMIN — FENTANYL CITRATE 50 MCG: 50 INJECTION, SOLUTION INTRAMUSCULAR; INTRAVENOUS at 12:23

## 2024-03-26 SDOH — HEALTH STABILITY: MENTAL HEALTH: CURRENT SMOKER: 0

## 2024-03-26 ASSESSMENT — PAIN SCALES - GENERAL
PAINLEVEL_OUTOF10: 7
PAINLEVEL_OUTOF10: 5 - MODERATE PAIN
PAINLEVEL_OUTOF10: 8
PAINLEVEL_OUTOF10: 7
PAINLEVEL_OUTOF10: 4
PAINLEVEL_OUTOF10: 6
PAINLEVEL_OUTOF10: 9
PAINLEVEL_OUTOF10: 4
PAINLEVEL_OUTOF10: 6
PAINLEVEL_OUTOF10: 7
PAINLEVEL_OUTOF10: 0 - NO PAIN
PAINLEVEL_OUTOF10: 8
PAINLEVEL_OUTOF10: 6
PAINLEVEL_OUTOF10: 6

## 2024-03-26 ASSESSMENT — PAIN - FUNCTIONAL ASSESSMENT
PAIN_FUNCTIONAL_ASSESSMENT: 0-10

## 2024-03-26 ASSESSMENT — PAIN DESCRIPTION - DESCRIPTORS
DESCRIPTORS: SORE
DESCRIPTORS: ACHING
DESCRIPTORS: SORE

## 2024-03-26 ASSESSMENT — PAIN DESCRIPTION - LOCATION
LOCATION: HIP

## 2024-03-26 ASSESSMENT — PAIN DESCRIPTION - ORIENTATION
ORIENTATION: LEFT

## 2024-03-26 ASSESSMENT — COLUMBIA-SUICIDE SEVERITY RATING SCALE - C-SSRS
2. HAVE YOU ACTUALLY HAD ANY THOUGHTS OF KILLING YOURSELF?: NO
6. HAVE YOU EVER DONE ANYTHING, STARTED TO DO ANYTHING, OR PREPARED TO DO ANYTHING TO END YOUR LIFE?: NO
1. IN THE PAST MONTH, HAVE YOU WISHED YOU WERE DEAD OR WISHED YOU COULD GO TO SLEEP AND NOT WAKE UP?: NO

## 2024-03-26 NOTE — OP NOTE
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 [D17.24]     * Trochanteric bursitis, left hip [M70.62]     Procedures  ExcisionOPEN LEFT HIP EXCISISON OF LIPOMA WITH EXCISION OF TROCHANTERIC BURSA Lesion Pelvis, MIYA JUNE  13837 - CT EXC TUMOR SOFT TISSUE PELVIS & HIP SUBFASC 5CM/>    CT EXCISION TROCHANTERIC BURSA/CALCIFICATION [81302]  Surgeons      * Homero Ibrahim - Primary    Resident/Fellow/Other Assistant:  Surgeon(s) and Role:    Procedure Summary  Anesthesia: General  ASA: II  Anesthesia Staff: Anesthesiologist: Ruslan Lopez MD  Estimated Blood Loss: 10 mL  Intra-op Medications:   Administrations occurring from 1130 to 1300 on 24:   Medication Name Total Dose   vancomycin (Vancocin) vial for injection 1 g   bupivacaine PF (Marcaine) 0.25 % (2.5 mg/mL) injection 20 mL   lactated Ringer's infusion Cannot be calculated   ceFAZolin in dextrose (iso-os) (Ancef) IVPB 2 g 2 g   HYDROmorphone (Dilaudid) injection 0.5 mg 1 mg              Anesthesia Record               Intraprocedure I/O Totals          Intake    lactated Ringer's infusion 800.00 mL    Total Intake 800 mL          Specimen:   ID Type Source Tests Collected by Time   1 : SUBCUTANEOUS MASS LEFT HIP Tissue SOFT TISSUE MASS RESECTION SURGICAL PATHOLOGY EXAM Homero Ibrahim MD 3/26/2024 1205        Staff:   Circulator: Delphine Kapadia RN  Relief Circulator: Toya Mya RN  Relief Scrub: Briana Hamilton  Scrub Person: Elva Escalona         Drains and/or Catheters: * None in log *    Tourniquet Times:          Implants:     Findings: see procedure details    History: 54-year-old female well-known to me with history of longstanding left hip trochanteric bursitis.  Patient also did have going laterally based hip mass that was within the subcutaneous tissues.  MRI was obtained of bilateral hips MRI was consistent with lipoma.  Given worsening pain about the lateral aspect of the hip as well as enlarging and painful lipoma patient wishes to proceed with surgical intervention to include excision of lipoma as well as debridement of the left hip trochanteric bursa.  Risk benefits alternatives of treatment were discussed with patient in detail using shared informed decision making she wish to proceed.  She has attempted multiple forms of conservative treatment to include activity modifications, multiple injections, dedicated physical therapy with little to no relief for years duration.    Operative Procedure:  I met with Deidra in the preoperative area prior to the procedure and discussed the surgical plan once again and answered all questions related to the procedure and the expected post-operative course. The risks of surgery were discussed including but not limited to the risks of medications given for surgery, the risk of blood loss during and after surgery that can lead to the need for blood products in certain situations, infection, damage to normal structures that can lead to long term problems of pain or dysfunction, wound healing complications, the possibility of nonunion/malunion of any osteotomies and late or chronic pain as a result of the surgical intervention.  In addition potentially life threatening complications that can occur at the time of surgery and after surgery were discussed including but not limited to deep vein thrombosis, pulmonary embolism, myocardial infarction, stroke and death.    The patient was identified in the pre-operative holding area.  The surgical site was identified and marked. Informed  consent was obtained.      The patient was then brought to the operating room and placed supine on the operating table.  The patient was then subsequently placed in the lateral decubitus position with the appropriately placed axillary roll as well as well-padded lower extremities.  Care was made to ensure that the legs were placed in a comfortable position at the peroneal nerve was padded and that the head and neck were in comfortable position.  Anesthesia was administered and care of the head, neck, and airway was maintained by the anesthesia staff throughout the entire procedure.  All bony prominences were identified and padded    Surgical time out was performed with all operative staff in participation. 2g ancef  were given prior to incision.         A laterally based incision was made about the left hip just posterior to palpable lipoma mass.  Incision was centered over the greater trochanter.  Skin was incised hemostasis obtained with Bovie cautery.  The subcutaneous mass was immediately identified.  Utilizing blunt dissection as well as Metzenbaum scissors the lipoma mass was removed in its entirety without difficulty.  The mass was 4 x 4 cm in size consistent with lipomatous type tissue.  This was subsequently sent to pathology for evaluation.  Dissection was further carried down to the IT band.  The IT band was incised in line with the hip.  Underlying trochanteric bursa was evaluated and identified.  There was extensive amounts of trochanteric bursitis this was debrided and excised.  Hip abductor tendons were subsequently evaluated and were found to be intact.  No appreciable tears.  Hemostasis was subsequently obtained with Bovie cautery.  After appropriate excision of trochanteric bursa with a combination of curettes sharp dissection as well as rongeur.  Hemostasis obtained with Bovie cautery.  The wound was subsequently irrigated with normal saline solution as well as Irrisept.    The incision was closed  in a layered fashion utlilizing 0 Vicryl, 2-0 Vicryl, 3-0 Monocryl skin glue.    Dry sterile dressings were then applied.  The patient was then awakened from anesthetic, transferred to PACU and taken to the recovery room in stable medical condition.        POST OPERATIVE PLAN  Follow up: 2 weeks for incision check and suture removal.  Weight Bearing: Weightbearing as tolerated  DVT Prophylaxis: None  X-Rays at follow up: None  Pain Medications: As prescribed  Other:    Homero Ibrahim III, MD          Complications:  None; patient tolerated the procedure well.    Disposition: PACU - hemodynamically stable.  Condition: stable         Homero Ibrahim  Phone Number: [phone]

## 2024-03-26 NOTE — ANESTHESIA PREPROCEDURE EVALUATION
Deidra Chacon is a 54 y.o. 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   (+) Trochanteric bursitis, left hip      Other   (+) Ankylosing spondylitis of multiple sites in spine (CMS/HCC)       Lab Results   Component Value Date    HGB 11.1 (L) 03/19/2024    HCT 34.2 (L) 03/19/2024    WBC 6.3 03/19/2024     03/19/2024     03/19/2024    K 3.7 03/19/2024     03/19/2024    CREATININE 0.66 03/19/2024    BUN 10 03/19/2024     EKG 3/2024:  Normal sinus rhythm  Normal ECG  No previous ECGs available      Social History     Tobacco Use   Smoking Status Never   Smokeless Tobacco Never       Allergies   Allergen Reactions    Cephalexin Other     yeast infections    Oseltamivir Itching and Rash       Current Outpatient Medications   Medication Instructions    atorvastatin (LIPITOR) 10 mg, oral, Daily    CeleBREX 200 mg, oral, As needed    DULoxetine (CYMBALTA) 30 mg, oral, Every evening    ergocalciferol (Vitamin D-2) 1.25 MG (68445 UT) capsule 1 capsule, oral, Weekly    levothyroxine (Synthroid, Levoxyl) 50 mcg tablet take one tablet (50mcg) by mouth once daily    PlaqueniL 200 mg, oral, Daily       Past Surgical History:   Procedure Laterality Date    HYSTERECTOMY  08/01/2016    Hysterectomy    LASIK      OTHER SURGICAL HISTORY Right 10/07/2021    Carpal tunnel surgery    OTHER SURGICAL HISTORY  10/07/2021    Vaginal sling procedure    OTHER SURGICAL HISTORY  12/02/2021    Umbilical hernia repair laparoscopic    SINUS SURGERY  08/01/2016    Sinus Surgery       Family  History   Problem Relation Name Age of Onset    COPD Mother          severe    Other (cardiac disorder) Father      Diabetes Father      Migraines Father      Cancer Maternal Grandmother      Cancer Maternal Grandfather      Heart failure Paternal Grandmother      Heart attack Paternal Grandfather         NPO Details:  No data recorded    Physical Exam    Airway  Mallampati: III  TM distance: >3 FB  Neck ROM: full     Cardiovascular    Dental - normal exam     Pulmonary    Abdominal            Anesthesia Plan    History of general anesthesia?: yes  History of complications of general anesthesia?: no    ASA 2     general     The patient is not a current smoker.    intravenous induction   Postoperative administration of opioids is intended.  Trial extubation is planned.  Anesthetic plan and risks discussed with patient.

## 2024-03-26 NOTE — PERIOPERATIVE NURSING NOTE
5mg oxycodone pill now. Explained to patient that IV pain meds are putting her to sleep and she needs to stay awake and deep breath to maintain oxygen level above 90%. States understanding

## 2024-03-26 NOTE — ANESTHESIA PROCEDURE NOTES
Airway  Date/Time: 3/26/2024 11:29 AM  Urgency: elective    Airway not difficult    Staffing  Performed: attending   Authorized by: Ruslan Lopez MD    Performed by: Ruslan Lopez MD  Patient location during procedure: OR    Indications and Patient Condition  Indications for airway management: anesthesia  Spontaneous Ventilation: absent  Sedation level: deep  Preoxygenated: yes  Patient position: sniffing  Mask difficulty assessment: 1 - vent by mask  Planned trial extubation    Final Airway Details  Final airway type: endotracheal airway      Successful airway: ETT  Cuffed: yes   Successful intubation technique: direct laryngoscopy  Facilitating devices/methods: intubating stylet  Endotracheal tube insertion site: oral  Blade: Jacobo  Blade size: #4  ETT size (mm): 7.0  Cormack-Lehane Classification: grade IIa - partial view of glottis  Placement verified by: capnometry   Measured from: lips  ETT to lips (cm): 22  Number of attempts at approach: 1

## 2024-03-26 NOTE — DISCHARGE INSTRUCTIONS
Medication given may have significant effects after discharge. Therefore on the day of surgery:  1) you must be accompanied by a responsible adult upon discharge and for 24 hours after surgery.  Do not drive a motor vehicle, operate machinery, power tools or appliance, drink alcoholic beverages, or make critical decisions for 24 hours  2) Be aware of dizziness, which may cause a fall. Change positions slowly.  3) Eating: you may resume your regular diet but it is better to increase intake slowly with mild foods and working up to your regular diet. No greasy, fried or spicy foods today.  4) Nausea/Vomiting: Nausea and vomiting may occur as you become more active or begin to increase food intake. If this should happen, decrease activity and return to liquids. If the problem persists, call your surgeon  5) Pain: Your surgeon may have given you a prescription for pain medication. Take pain medication with food as prescribed. Pain medication may cause constipation, so drink plenty of fluids. If your pain medication does not provide adequate relief, call your surgeon  6) Urinating: Notify your surgeon if you have not urinated within 12 hours after discharge  7) Ice: Apply ice to operative site for 20 min 5-6 times a day or use Polar care as instructed  8) Dressing:   [x]   Keep dressing on for 6 days then remove.  Okay to shower 48 hours after surgery   []  Remove dressing in 48hr   []  Leave open to air after initial dressing is taken off and incision is dry  Do not remove the steri-strips. (no bath/ hot tubs/ pools)   []  Leave dressing in place. Keep dressing/ incision clean and dry    9) Activity    Shoulder/ elbow/Hand   []  Elevate extremity    []  Sling   [] at all times (except for exercises and showering)  [] as needed only for comfort   [] Begin daily motion exercises out of sling as instructed   []  Bend and flex fingers/wrist/elbow frequently   [] other   Knee/ Ankle/ Foot   [] elevate extremity   []  crutches        [] non-weight bearing to operative extremity  [] partial weight bearing  [x] Full weight bearing as tolerated   []  Use brace whenever walking   [] other      10) Begin physical therapy if advised by you physician:   [] before returning to see you doctor   [x] not until you follow up with your doctor    11) call your doctor at [phone] for an appointment (or follow up as scheduled)    Contact [facility] office if  Increased redness, swelling, drainage of any kind, and/or pain to surgery site.  As well as new onset fevers and or chills.  These could signify an infection.  Calf or thigh tenderness to touch as well as increased swelling or redness.  This could signify a clot formation.  Numbness or tingling to an area around the incision site or below the incision site (toes). Or if the operative extremity becomes cold, blue.  Any rash appears, increased  or new onset nausea/vomiting occur.  This may indicate a reaction to a medication.  Temp is 38.5 C (101F)  12) If you have any concerns or questions, please call [facility] surgeon on call. The 24- hour phone is [phone]  13) If you are unable to contact your surgeon, in an emergency situation, go to the nearest hospital     General Anesthesia Discharge Instructions    About this topic  You may need general anesthesia if you need to be asleep during a procedure. Your doctor will use drugs to block the signals that go from your nerves to your brain. Doctors give general anesthesia during a surgery or procedure to:  Allow you to sleep  Help your body be still  Relax your muscles  Help you to relax and be pain free  Keep you from remembering the surgery  Let the doctor manage your airway, breathing, and blood flow  The doctor or nurse anesthetist gives general anesthesia by a shot into your vein. Sometimes, you may breathe in a gas through a mask placed over your face.  What care is needed at home?  Ask your doctor what you  need to do when you go home. Make sure you ask questions if you do not understand what the doctor says.  Your doctor may give you drugs to prevent or treat an upset stomach from the anesthetic. Take them as ordered.  If your throat is sore, suck on ice chips or popsicles to ease throat pain.  Put 2 to 3 pillows under your head and back when you lie down to help you breathe easier.  For the first 24 to 48 hours:  Do not operate heavy or dangerous machinery.  Do not make major decisions or sign important papers. You may not be able to think clearly.  Avoid beer, wine, or mixed drinks.  You are at a higher risk of falling for at least 24 hours after general anesthesia.  Take extra care when you get up.  Do not change positions quickly.  Do not rush when you need to go to the bathroom or to answer the phone.  Ask for help if you feel unsteady when you try to walk.  Wear shoes with non-slip soles and low heels.  What follow-up care is needed?  Your doctor may ask you to come back to the office to check on your progress. Be sure to keep these visits.  If you have stitches that do not dissolve or staples, you will need to have them removed. Your doctor will want to do this in 1 to 2 weeks. If the doctor used skin glue, the glue will fall off on its own.  What drugs may be needed?  The doctor may order drugs to:  Help with pain  Treat an upset stomach or throwing up  Will physical activity be limited?  You will not be allowed to drive right away after the procedure. Ask a family member or a friend to drive you home.  Avoid trying to get out of bed without help until you are sure of your balance.  You may have to limit your activity. Talk to your doctor about if you need to limit how much you lift or limit exercise after your procedure.  What changes to diet are needed?  Start with a light diet when you are fully awake. This includes things that are easy to swallow like soups, pudding, jello, toast, and eggs. Slowly progress  to your normal diet.  What problems could happen?  Low blood pressure  Breathing problems  Upset stomach or throwing up  Dizziness  Blood clots  Infection  When do I need to call the doctor?  Trouble breathing  Upset stomach or throwing up more than 3 times in the next 2 days  Dizziness  Teach Back: Helping You Understand  The Teach Back Method helps you understand the information we are giving you. After you talk with the staff, tell them in your own words what you learned. This helps to make sure the staff has described each thing clearly. It also helps to explain things that may have been confusing. Before going home, make sure you can do these:  I can tell you about my procedure.  I can tell you if I need to follow up with my doctor.  I can tell you what is good for me to eat and drink the next day.  I can tell you what I would do if I have trouble breathing, an upset stomach, or dizziness.  Where can I learn more?  [employer] [place] [employer]  https://www.nigms.nih.gov/education/pages/factsheet_Anesthesia.aspx  [facility] Choices  http://www.nhs.uk/conditions/Anaesthetic-general/Pages/Definition.aspx  Last Reviewed Date  2020-04-22

## 2024-03-26 NOTE — PERIOPERATIVE NURSING NOTE
SPO2 down to 86% after pain medicine. Sleeping. Awakened and encouraged to deep breath and SPO2 up to 94%.  She states pain is bad. Nurse explained how her SPO2 drops after receiving pain medicine because she falls asleep. She states understanding.

## 2024-03-26 NOTE — ANESTHESIA POSTPROCEDURE EVALUATION
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 [D17.24])      (Trochanteric bursitis, left hip [M70.62])    Surgeons: Homero Ibrahim MD Responsible Provider: Ruslan Lopez MD    Anesthesia Type: general ASA Status: 2            Anesthesia Type: general    Vitals Value Taken Time   /84 03/26/24 1242   Temp 36.0 03/26/24 1242   Pulse 72 03/26/24 1242   Resp 12 03/26/24 1242   SpO2 100 03/26/24 1242       Anesthesia Post Evaluation    Patient location during evaluation: PACU  Patient participation: complete - patient participated  Level of consciousness: awake and alert  Pain management: adequate  Multimodal analgesia pain management approach  Airway patency: patent  Cardiovascular status: acceptable  Respiratory status: acceptable  Hydration status: acceptable  Postoperative Nausea and Vomiting: none        No notable events documented.

## 2024-04-01 ENCOUNTER — OFFICE VISIT (OUTPATIENT)
Dept: ORTHOPEDIC SURGERY | Facility: CLINIC | Age: 54
End: 2024-04-01
Payer: COMMERCIAL

## 2024-04-01 DIAGNOSIS — M70.62 TROCHANTERIC BURSITIS OF LEFT HIP: Primary | ICD-10-CM

## 2024-04-01 PROCEDURE — 1036F TOBACCO NON-USER: CPT | Performed by: STUDENT IN AN ORGANIZED HEALTH CARE EDUCATION/TRAINING PROGRAM

## 2024-04-01 PROCEDURE — 99024 POSTOP FOLLOW-UP VISIT: CPT | Performed by: STUDENT IN AN ORGANIZED HEALTH CARE EDUCATION/TRAINING PROGRAM

## 2024-04-01 NOTE — PROGRESS NOTES
Chief Complaint   Patient presents with    Left Hip - Post-op     ExcisionOPEN LEFT HIP EXCISISON OF LIPOMA WITH EXCISION OF TROCHANTERIC BURSA  DOS: 03/26/24 (7 days out)       HPI  Patient presents today for follow up of left hip.  Patient status post hip excision of lipoma, excision of left hip trochanteric bursa 7 days prior.  No issues currently.  Does have some laterally based hip pain as expected.       Physical exam  General: Alert and oriented to place, person, and time.  No acute distress and breathing comfortably; pleasant and cooperative with the examination.  Extremity:  Focused examination left hip: Incision healing well no surrounding erythema no active drainage.  Able to gently ambulate today mild discomfort with hip abduction type exercises activities.  Foot is warm and well-perfused neurovascular intact.  Negative Sisi.    Diagnostics:  ECG 12 Lead    Result Date: 3/20/2024  Normal sinus rhythm Normal ECG No previous ECGs available Confirmed by Marin Marti ([id number]) on 3/20/2024 2:13:29 PM       Procedures  Procedures     Assessment:  54-year-old female status post excision of left hip lipoma, excision of trochanteric bursa    Treatment plan:  Will see how patient does over the next 4 weeks she will return to clinic in 4 weeks time  Will monitor pathology specimen of hip lipoma likely  Discussed activities to avoid  Recommended weight-bear as tolerated however avoiding hip abduction exercises.  Intraoperative findings discussed with patient in detail  All of the patient's questions concerns answered

## 2024-04-02 DIAGNOSIS — E03.9 ACQUIRED HYPOTHYROIDISM: ICD-10-CM

## 2024-04-02 RX ORDER — LEVOTHYROXINE SODIUM 50 UG/1
TABLET ORAL
Qty: 90 TABLET | Refills: 3 | Status: SHIPPED | OUTPATIENT
Start: 2024-04-02

## 2024-04-04 LAB
LABORATORY COMMENT REPORT: NORMAL
PATH REPORT.FINAL DX SPEC: NORMAL
PATH REPORT.GROSS SPEC: NORMAL
PATH REPORT.RELEVANT HX SPEC: NORMAL
PATH REPORT.TOTAL CANCER: NORMAL

## 2024-05-08 ENCOUNTER — OFFICE VISIT (OUTPATIENT)
Dept: ORTHOPEDIC SURGERY | Facility: CLINIC | Age: 54
End: 2024-05-08
Payer: COMMERCIAL

## 2024-05-08 DIAGNOSIS — M70.62 TROCHANTERIC BURSITIS OF LEFT HIP: Primary | ICD-10-CM

## 2024-05-08 PROCEDURE — 99024 POSTOP FOLLOW-UP VISIT: CPT | Performed by: STUDENT IN AN ORGANIZED HEALTH CARE EDUCATION/TRAINING PROGRAM

## 2024-05-08 PROCEDURE — 1036F TOBACCO NON-USER: CPT | Performed by: STUDENT IN AN ORGANIZED HEALTH CARE EDUCATION/TRAINING PROGRAM

## 2024-05-08 ASSESSMENT — PAIN - FUNCTIONAL ASSESSMENT: PAIN_FUNCTIONAL_ASSESSMENT: NO/DENIES PAIN

## 2024-05-08 NOTE — PROGRESS NOTES
Chief Complaint   Patient presents with    Left Hip - Follow-up     OPEN LEFT HIP EXCISISON OF LIPOMA WITH EXCISION OF TROCHANTERIC BURSA  DOS: 03/26/24         HPI  Patient presents today for follow up of her left hip.  She is status post open left hip excision of lipoma.  She is doing very well pain much improved about the lateral aspect of her hip.  Very happy with her progress.  No pain at all today.      Past Medical History:   Diagnosis Date    Candidiasis, unspecified 08/06/2020    Yeast infection    Cough, unspecified 08/01/2016    Cough    COVID-19     VACCINATED    Hyperlipidemia     Hypothyroidism     Inflammatory polyarthropathy (Multi) 09/12/2019    Polyarthritis, inflammatory    Other specified complications of surgical and medical care, not elsewhere classified, initial encounter 12/02/2021    Fluid collection at surgical site    Otitis media, unspecified, unspecified ear 01/12/2016    Ear infection    Pain in left hip 05/22/2018    Left hip pain    Pain in right knee 09/21/2016    Right knee pain    Pain in right shoulder 09/12/2019    Right shoulder pain, unspecified chronicity    Pelvic and perineal pain 10/14/2021    Female pelvic pain    Personal history of diseases of the blood and blood-forming organs and certain disorders involving the immune mechanism 09/12/2019    History of autoimmune disorder    Personal history of other diseases of the digestive system 12/02/2021    History of umbilical hernia    Personal history of other diseases of the digestive system 12/10/2020    History of hemorrhoids    Personal history of other diseases of the musculoskeletal system and connective tissue 06/17/2019    History of low back pain    Personal history of other diseases of the musculoskeletal system and connective tissue 06/23/2016    History of bursitis    Personal history of other diseases of the nervous system and sense organs 09/02/2021    History of carpal tunnel syndrome    Personal history of  other endocrine, nutritional and metabolic disease 12/17/2019    History of vitamin D deficiency    Personal history of other mental and behavioral disorders 07/27/2020    History of depression    Personal history of other specified conditions     History of abdominal pain    Personal history of urinary (tract) infections     History of urinary tract infection    Radiculopathy, cervical region 09/09/2019    Cervical radiculopathy    Radiculopathy, lumbar region 06/17/2019    Lumbar radiculopathy    Radiculopathy, lumbar region 09/09/2019    Chronic lumbar radiculopathy    Spinal stenosis, lumbar region without neurogenic claudication 01/19/2021    Spinal stenosis of lumbar region without neurogenic claudication    Unspecified urinary incontinence 10/07/2021    Urinary incontinence in female    Urge incontinence 10/07/2021    Urge incontinence       Medication Documentation Review Audit       Reviewed by Fely Barbour MA (Medical Assistant) on 05/08/24 at 0900      Medication Order Taking? Sig Documenting Provider Last Dose Status   atorvastatin (Lipitor) 10 mg tablet [id number] No Take 1 tablet (10 mg) by mouth once daily.   Patient taking differently: Take 1 tablet (10 mg) by mouth every other day.    Han Caldera MD Past Week Active   CeleBREX 200 mg capsule [id number] No Take 1 capsule (200 mg) by mouth if needed. Historical Provider, MD Taking Active   DULoxetine (Cymbalta) 30 mg DR capsule [id number] No Take 1 capsule (30 mg) by mouth once daily in the evening. aHn Caldera MD 3/25/2024 0800 Active   ergocalciferol (Vitamin D-2) 1.25 MG (26573 UT) capsule [id number] No Take 1 capsule (1,250 mcg) by mouth 1 (one) time per week. Angel Laurent MD 3/25/2024 0800 Active   levothyroxine (Synthroid, Levoxyl) 50 mcg tablet [id number]  take one tablet (50mcg) by mouth once daily Han Caldera MD  Active   PlaqueniL 200 mg tablet [id number] No Take 1 tablet (200 mg) by mouth once daily. Historical  Provider, MD 3/25/2024 0800 Active                    Allergies   Allergen Reactions    Cephalexin Other     yeast infections    Oseltamivir Itching and Rash       Social History     Socioeconomic History    Marital status:      Spouse name: Not on file    Number of children: Not on file    Years of education: Not on file    Highest education level: Not on file   Occupational History    Not on file   Tobacco Use    Smoking status: Never    Smokeless tobacco: Never   Vaping Use    Vaping status: Never Used   Substance and Sexual Activity    Alcohol use: Yes     Comment: occasional    Drug use: Never    Sexual activity: Yes     Partners: Male   Other Topics Concern    Not on file   Social History Narrative    Not on file     Social Determinants of Health     Financial Resource Strain: Not on file   Food Insecurity: Not on file   Transportation Needs: Not on file   Physical Activity: Not on file   Stress: Not on file   Social Connections: Not on file   Intimate Partner Violence: Not on file   Housing Stability: Not on file       Past Surgical History:   Procedure Laterality Date    HYSTERECTOMY  08/01/2016    Hysterectomy    LASIK      OTHER SURGICAL HISTORY Right 10/07/2021    Carpal tunnel surgery    OTHER SURGICAL HISTORY  10/07/2021    Vaginal sling procedure    OTHER SURGICAL HISTORY  12/02/2021    Umbilical hernia repair laparoscopic    SINUS SURGERY  08/01/2016    Sinus Surgery          Review of Systems   GENERAL: Negative  CV: NEGATIVE  RESPIRATORY: Negative  GI: Negative  MUSCULOSKELETAL: See HPI  SKIN: Negative  NEURO:  Negative     Physical Exam:  General: No acute distress alert and orient x3, alert and cooperative  HEENT: Normocephalic atraumatic.  Pupils round and reactive.  Trachea midline  Cardiovascular: Regular rate and rhythm.  Respiratory: Bilateral chest rise.  Breathing unlabored.  Abdomen: Nontender nondistended no rebounding.  See formal musculoskeletal below:    Affected  Extremity:  Left hip:  Normal gait  Negative Trendelenburg sign  Skin healthy and intact  No tenderness to palpation over lumbar spine  No tenderness over greater trochanter     Full forward flexion  Symmetric motion, no loss of internal rotation   No weakness with resisted hip flexion, abduction or adduction     Negative flexion/adduction/internal rotation  Negative flexion/abduction/external rotation test  Negative straight leg raise  Neurovascular exam normal distally    Diagnostics:  No results found.     Procedures  Procedures     Assessment:  54-year-old female status post lipoma excision, excision of left hip trochanteric bursa    Treatment plan:  Doing well  Very happy that she proceed with surgical treatment  She will follow-up as needed  Discussed activities to avoid as well as importance of using pain as a guide.  She will work on gentle strengthening.  All of the patient's questions concerns answered

## 2024-09-12 ENCOUNTER — OFFICE VISIT (OUTPATIENT)
Dept: PRIMARY CARE | Facility: CLINIC | Age: 54
End: 2024-09-12
Payer: COMMERCIAL

## 2024-09-12 VITALS
OXYGEN SATURATION: 98 % | HEIGHT: 67 IN | TEMPERATURE: 97.2 F | BODY MASS INDEX: 25.74 KG/M2 | DIASTOLIC BLOOD PRESSURE: 77 MMHG | HEART RATE: 74 BPM | SYSTOLIC BLOOD PRESSURE: 134 MMHG | WEIGHT: 164 LBS

## 2024-09-12 DIAGNOSIS — M45.0 ANKYLOSING SPONDYLITIS OF MULTIPLE SITES IN SPINE (MULTI): ICD-10-CM

## 2024-09-12 DIAGNOSIS — E78.2 HYPERLIPEMIA, MIXED: ICD-10-CM

## 2024-09-12 DIAGNOSIS — G62.9 NEUROPATHY: ICD-10-CM

## 2024-09-12 DIAGNOSIS — M79.605 PAIN OF LEFT LOWER EXTREMITY: Primary | ICD-10-CM

## 2024-09-12 DIAGNOSIS — E03.9 ACQUIRED HYPOTHYROIDISM: ICD-10-CM

## 2024-09-12 DIAGNOSIS — Z23 NEED FOR INFLUENZA VACCINATION: ICD-10-CM

## 2024-09-12 DIAGNOSIS — Z00.00 HEALTH CARE MAINTENANCE: ICD-10-CM

## 2024-09-12 DIAGNOSIS — D50.0 IRON DEFICIENCY ANEMIA DUE TO CHRONIC BLOOD LOSS: ICD-10-CM

## 2024-09-12 PROBLEM — M70.62 TROCHANTERIC BURSITIS, LEFT HIP: Status: RESOLVED | Noted: 2024-03-26 | Resolved: 2024-09-12

## 2024-09-12 PROCEDURE — 90471 IMMUNIZATION ADMIN: CPT | Performed by: INTERNAL MEDICINE

## 2024-09-12 PROCEDURE — 3008F BODY MASS INDEX DOCD: CPT | Performed by: INTERNAL MEDICINE

## 2024-09-12 PROCEDURE — 1036F TOBACCO NON-USER: CPT | Performed by: INTERNAL MEDICINE

## 2024-09-12 PROCEDURE — 99214 OFFICE O/P EST MOD 30 MIN: CPT | Performed by: INTERNAL MEDICINE

## 2024-09-12 PROCEDURE — 90673 RIV3 VACCINE NO PRESERV IM: CPT | Performed by: INTERNAL MEDICINE

## 2024-09-12 RX ORDER — FLUTICASONE PROPIONATE 50 MCG
2 SPRAY, SUSPENSION (ML) NASAL DAILY
Qty: 16 G | Refills: 6 | Status: SHIPPED | OUTPATIENT
Start: 2024-09-12

## 2024-09-12 RX ORDER — UBIDECARENONE 30 MG
CAPSULE ORAL
COMMUNITY

## 2024-09-12 RX ORDER — FLUTICASONE PROPIONATE 50 MCG
2 SPRAY, SUSPENSION (ML) NASAL DAILY
COMMUNITY
Start: 2021-06-01 | End: 2024-09-12 | Stop reason: SDUPTHER

## 2024-09-12 RX ORDER — CETIRIZINE HYDROCHLORIDE 10 MG/1
10 TABLET ORAL DAILY
Qty: 90 TABLET | Refills: 3 | Status: SHIPPED | OUTPATIENT
Start: 2024-09-12

## 2024-09-12 RX ORDER — DICLOFENAC SODIUM 10 MG/G
4 GEL TOPICAL 4 TIMES DAILY
Qty: 100 G | Refills: 1 | Status: SHIPPED | OUTPATIENT
Start: 2024-09-12

## 2024-09-12 NOTE — ASSESSMENT & PLAN NOTE
Check urinalysis stool refer to GI  
Keep TSH less than 4 continue Synthyroid 50 mcg a day  
Monitor CMP CPK lipid twice a year  
Posttraumatic hematoma Voltaren gel twice a day  
Refer to rheumatologist and physical therapy patient stopped taking Plaquenil because of the side effect  
Workup going on will check iron B12 folic acid DARRON rheumatoid thyroid and sugar given patient B12 folic acid and gabapentin therapy  
No pertinent family history in first degree relatives

## 2024-09-12 NOTE — PROGRESS NOTES
Subjective   Patient ID: Deidra Chacon is a 54 y.o. female who presents for Leg Injury (Lump on left leg wants looked at //Also shooting pains through her legs- 2 years ).    Assessment/Plan     Problem List Items Addressed This Visit       Hyperlipemia, mixed     Monitor CMP CPK lipid twice a year         Relevant Orders    Hepatitis C antibody    Hemoglobin A1c    BI mammo bilateral screening tomosynthesis    Uric Acid    TSH with reflex to Free T4 if abnormal    Magnesium    Comprehensive Metabolic Panel    Lipid Panel    CK    Iron and TIBC    Vitamin B12    Haptoglobin    Folate    Ferritin    Reticulocytes    Acquired hypothyroidism     Keep TSH less than 4 continue Synthyroid 50 mcg a day         Relevant Orders    Hepatitis C antibody    Hemoglobin A1c    BI mammo bilateral screening tomosynthesis    Uric Acid    TSH with reflex to Free T4 if abnormal    Magnesium    Comprehensive Metabolic Panel    Lipid Panel    CK    Iron and TIBC    Vitamin B12    Haptoglobin    Folate    Ferritin    Reticulocytes    Iron deficiency anemia due to chronic blood loss     Check urinalysis stool refer to GI         Relevant Orders    Hepatitis C antibody    Hemoglobin A1c    BI mammo bilateral screening tomosynthesis    Uric Acid    TSH with reflex to Free T4 if abnormal    Magnesium    Comprehensive Metabolic Panel    Lipid Panel    CK    Iron and TIBC    Vitamin B12    Haptoglobin    Folate    Ferritin    Reticulocytes    Ankylosing spondylitis of multiple sites in spine (Multi)     Refer to rheumatologist and physical therapy patient stopped taking Plaquenil because of the side effect         Relevant Orders    Hepatitis C antibody    Hemoglobin A1c    BI mammo bilateral screening tomosynthesis    Uric Acid    TSH with reflex to Free T4 if abnormal    Magnesium    Comprehensive Metabolic Panel    Lipid Panel    CK    Iron and TIBC    Vitamin B12    Haptoglobin    Folate    Ferritin    Reticulocytes    Health care maintenance     Relevant Medications    cetirizine (ZyrTEC) 10 mg tablet    fluticasone (Flonase) 50 mcg/actuation nasal spray    Other Relevant Orders    Hepatitis C antibody    Hemoglobin A1c    BI mammo bilateral screening tomosynthesis    Uric Acid    TSH with reflex to Free T4 if abnormal    Magnesium    Comprehensive Metabolic Panel    Lipid Panel    CK    Iron and TIBC    Vitamin B12    Haptoglobin    Folate    Ferritin    Reticulocytes    Pain of left lower extremity - Primary     Posttraumatic hematoma Voltaren gel twice a day         Relevant Medications    diclofenac sodium (Voltaren) 1 % gel    Need for influenza vaccination    Relevant Orders    Flu vaccine, trivalent, preservative free, no egg protein, age 18y+ (Flublok) (Completed)    Hepatitis C antibody    Hemoglobin A1c    BI mammo bilateral screening tomosynthesis    Uric Acid    TSH with reflex to Free T4 if abnormal    Magnesium    Comprehensive Metabolic Panel    Lipid Panel    CK    Iron and TIBC    Vitamin B12    Haptoglobin    Folate    Ferritin    Reticulocytes    Neuropathy     Workup going on will check iron B12 folic acid DARRON rheumatoid thyroid and sugar given patient B12 folic acid and gabapentin therapy         Relevant Orders    Hepatitis C antibody    Hemoglobin A1c    BI mammo bilateral screening tomosynthesis    Uric Acid    TSH with reflex to Free T4 if abnormal    Magnesium    Comprehensive Metabolic Panel    Lipid Panel    CK    Iron and TIBC    Vitamin B12    Haptoglobin    Folate    Ferritin    Reticulocytes    Adult BMI 25.0-25.9 kg/sq m   Patient was evaluated today, problem list was reviewed, problems and concerns addressed, Rx list reviewed and updated, lab and tests were noted and reviewed. Life style changes were discussed, always it works better if we eat plant based diet and plenty of fibres and roughage. Consume adequate amount of water and avoid alcohol, light to moderate physical activities and stress reduction are always  beneficial for ongoing physical well being. Do not forget to have 6 to 7 hours of sleep regularly and avoid late night jeremy screen exposure.      HPI  54-year-old patient  with children personal history of hypertension hyperlipidemia anxiety depression allergic rhinitis hypothyroidism autoimmune ankylosing spondylosis seen by rheumatologist complaining of the posttraumatic injury to the left foot and the left side went to the podiatrist going for the MRI of the left foot    Had a posttraumatic hematoma formation of the left thigh on palpation tenderness no any phlebitis or radiation of the pain to the hip or ankle or knee    Negative for DVT    Also complaining of the sharp shooting burning pain day and night more at night to both lower extremity    Onset gradual duration few months progress slowly aggravating factor none associated problem autoimmune arthritis    Negative for bladder bowel involvement or weakness    Negative for fall    Negative for suicide      Past Medical History:   Diagnosis Date    Candidiasis, unspecified 08/06/2020    Yeast infection    Cough, unspecified 08/01/2016    Cough    COVID-19     VACCINATED    Hyperlipidemia     Hypothyroidism     Inflammatory polyarthropathy (Multi) 09/12/2019    Polyarthritis, inflammatory    Other specified complications of surgical and medical care, not elsewhere classified, initial encounter 12/02/2021    Fluid collection at surgical site    Otitis media, unspecified, unspecified ear 01/12/2016    Ear infection    Pain in left hip 05/22/2018    Left hip pain    Pain in right knee 09/21/2016    Right knee pain    Pain in right shoulder 09/12/2019    Right shoulder pain, unspecified chronicity    Pelvic and perineal pain 10/14/2021    Female pelvic pain    Personal history of diseases of the blood and blood-forming organs and certain disorders involving the immune mechanism 09/12/2019    History of autoimmune disorder    Personal history of other diseases  of the digestive system 12/02/2021    History of umbilical hernia    Personal history of other diseases of the digestive system 12/10/2020    History of hemorrhoids    Personal history of other diseases of the musculoskeletal system and connective tissue 06/17/2019    History of low back pain    Personal history of other diseases of the musculoskeletal system and connective tissue 06/23/2016    History of bursitis    Personal history of other diseases of the nervous system and sense organs 09/02/2021    History of carpal tunnel syndrome    Personal history of other endocrine, nutritional and metabolic disease 12/17/2019    History of vitamin D deficiency    Personal history of other mental and behavioral disorders 07/27/2020    History of depression    Personal history of other specified conditions     History of abdominal pain    Personal history of urinary (tract) infections     History of urinary tract infection    Radiculopathy, cervical region 09/09/2019    Cervical radiculopathy    Radiculopathy, lumbar region 06/17/2019    Lumbar radiculopathy    Radiculopathy, lumbar region 09/09/2019    Chronic lumbar radiculopathy    Spinal stenosis, lumbar region without neurogenic claudication 01/19/2021    Spinal stenosis of lumbar region without neurogenic claudication    Unspecified urinary incontinence 10/07/2021    Urinary incontinence in female    Urge incontinence 10/07/2021    Urge incontinence     Past Surgical History:   Procedure Laterality Date    HYSTERECTOMY  08/01/2016    Hysterectomy    LASIK      OTHER SURGICAL HISTORY Right 10/07/2021    Carpal tunnel surgery    OTHER SURGICAL HISTORY  10/07/2021    Vaginal sling procedure    OTHER SURGICAL HISTORY  12/02/2021    Umbilical hernia repair laparoscopic    SINUS SURGERY  08/01/2016    Sinus Surgery     Allergies   Allergen Reactions    Cephalexin Other     yeast infections    Oseltamivir Itching and Rash     Current Outpatient Medications   Medication Sig  Dispense Refill    atorvastatin (Lipitor) 10 mg tablet Take 1 tablet (10 mg) by mouth once daily. (Patient taking differently: Take 1 tablet (10 mg) by mouth every other day.) 90 tablet 3    CeleBREX 200 mg capsule Take 1 capsule (200 mg) by mouth if needed.      DULoxetine (Cymbalta) 30 mg DR capsule Take 1 capsule (30 mg) by mouth once daily in the evening. 90 capsule 3    ergocalciferol (Vitamin D-2) 1.25 MG (93662 UT) capsule Take 1 capsule (1,250 mcg) by mouth 1 (one) time per week.      levothyroxine (Synthroid, Levoxyl) 50 mcg tablet take one tablet (50mcg) by mouth once daily 90 tablet 3    mv-calcium-min-iron fm-FA-vitK (Multi For Her) 18 mg iron-600 mcg-80 mcg tablet as directed      cetirizine (ZyrTEC) 10 mg tablet Take 1 tablet (10 mg) by mouth once daily. 90 tablet 3    diclofenac sodium (Voltaren) 1 % gel Apply 4.5 inches (4 g) topically 4 times a day. 100 g 1    fluticasone (Flonase) 50 mcg/actuation nasal spray Administer 2 sprays into each nostril once daily. 16 g 6     No current facility-administered medications for this visit.     Family History   Problem Relation Name Age of Onset    COPD Mother          severe    Other (cardiac disorder) Father      Diabetes Father      Migraines Father      Cancer Maternal Grandmother      Cancer Maternal Grandfather      Heart failure Paternal Grandmother      Heart attack Paternal Grandfather       Social History     Socioeconomic History    Marital status:    Tobacco Use    Smoking status: Never    Smokeless tobacco: Never   Vaping Use    Vaping status: Never Used   Substance and Sexual Activity    Alcohol use: Yes     Comment: occasional    Drug use: Never    Sexual activity: Yes     Partners: Male     Immunization History   Administered Date(s) Administered    Flu vaccine (IIV4), preservative free *Check age/dose* 10/03/2016, 09/07/2017, 09/17/2018, 08/25/2020, 10/25/2022    Flu vaccine, quadrivalent, no egg protein, age 6 month or greater  "(FLUCELVAX) 11/01/2021, 10/24/2023    Flu vaccine, trivalent, preservative free, no egg protein, age 18y+ (Flublok) 09/12/2024    Influenza, Unspecified 09/09/2019    Influenza, seasonal, injectable 10/25/2022    Moderna SARS-CoV-2 Vaccination 03/18/2021, 04/15/2021, 01/06/2022    Pneumococcal polysaccharide vaccine, 23-valent, age 2 years and older (PNEUMOVAX 23) 09/09/2019    Tdap vaccine, age 7 year and older (BOOSTRIX, ADACEL) 02/07/2017    Zoster vaccine, recombinant, adult (SHINGRIX) 09/09/2019, 02/07/2020, 06/07/2020    Zoster, Unspecified 02/07/2020, 06/07/2020       Review of Systems  Review of systems is otherwise negative unless stated above or in history of present illness.    Objective   Visit Vitals  /77   Pulse 74   Temp 36.2 °C (97.2 °F)   Ht 1.702 m (5' 7\")   Wt 74.4 kg (164 lb)   SpO2 98%   BMI 25.69 kg/m²   OB Status Hysterectomy   Smoking Status Never   BSA 1.88 m²     Physical Exam  Constitutional: BMI 25   General: not in acute distress.   HENT:      Head: Normocephalic and atraumatic.      Nose: Nose normal.   Eyes:      Extraocular Movements: Extraocular movements intact.      Conjunctiva/sclera: Conjunctivae normal.   Cardiovascular: Heart murmur     Rate and Rhythm: Normal rate ,  No M/R/G  Pulmonary:      Effort: Pulmonary effort is normal.      Breath sounds: Normal, Bilat Equal AE  Skin: Dry skin     General: Skin is warm.   Neurological: Peripheral neuropathy affecting lower extremity bilaterally     Mental Status: He is alert and oriented to person, place, and time.   Psychiatric:         Mood and Affect: Mood normal.         Behavior: Behavior normal.   Musculoskeletal posttraumatic hematoma formation left thigh  FROM in all extremitirs,  Joint-no swelling or tenderness    No visits with results within 4 Month(s) from this visit.   Latest known visit with results is:   Admission on 03/26/2024, Discharged on 03/26/2024   Component Date Value Ref Range Status    Case Report " "03/26/2024    Final                    Value:Surgical Pathology                                Case: [id number]                                  Authorizing Provider:  Homero Ibrahim MD        Collected:           03/26/2024 1205              Ordering Location:     [facility]   Received:            03/26/2024 1319                                     OR                                                                           Pathologist:           Shavonne Jacinto MD                                                                           Specimen:    SOFT TISSUE MASS RESECTION, SUBCUTANEOUS MASS LEFT HIP                                     FINAL DIAGNOSIS 03/26/2024    Final                    Value:                          Left hip mass, excision:                          -- Mature adipose tissue consistent with lipoma (4.4 cm).                                  03/26/2024    Final                    Value:By the signature on this report, the individual or group listed as making                           the Final Interpretation/Diagnosis certifies that they have reviewed this                           case.     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  The                           specimen is inked.  Serial cross sections reveal a yellow homogeneous cut        "                    surface. Representative sections are submitted in 3 cassettes.                           [facility]                                 Radiology: Reviewed imaging in powerchart.  No results found.      Charting was completed using voice recognition technology and may include unintended errors.

## 2024-09-13 LAB
NON-UH HIE A/G RATIO: 1.1
NON-UH HIE ALB: 3.9 G/DL (ref 3.4–5)
NON-UH HIE ALK PHOS: 103 UNIT/L (ref 45–117)
NON-UH HIE BILIRUBIN, TOTAL: 0.4 MG/DL (ref 0.3–1.2)
NON-UH HIE BUN/CREAT RATIO: 12.5
NON-UH HIE BUN: 10 MG/DL (ref 9–23)
NON-UH HIE CALCIUM: 9.5 MG/DL (ref 8.7–10.4)
NON-UH HIE CALCULATED LDL CHOLESTEROL: 119 MG/DL (ref 60–130)
NON-UH HIE CALCULATED OSMOLALITY: 278 MOSM/KG (ref 275–295)
NON-UH HIE CHLORIDE: 106 MMOL/L (ref 98–107)
NON-UH HIE CHOLESTEROL: 205 MG/DL (ref 100–200)
NON-UH HIE CO2, VENOUS: 29 MMOL/L (ref 20–31)
NON-UH HIE CPK: 73 UNIT/L (ref 34–145)
NON-UH HIE CREATININE: 0.8 MG/DL (ref 0.5–0.8)
NON-UH HIE FERRITIN: 6 NG/ML (ref 10–291)
NON-UH HIE FOLATE: 14.7 NG/ML (ref 5.4–17.5)
NON-UH HIE GFR AA: >60
NON-UH HIE GLOBULIN: 3.4 G/DL
NON-UH HIE GLOMERULAR FILTRATION RATE: >60 ML/MIN/1.73M?
NON-UH HIE GLUCOSE: 90 MG/DL (ref 74–106)
NON-UH HIE GOT: 16 UNIT/L (ref 15–37)
NON-UH HIE GPT: 12 UNIT/L (ref 10–49)
NON-UH HIE HAPTOGLOBIN: 205 MG/DL (ref 30–200)
NON-UH HIE HCT: 35.3 % (ref 36–46)
NON-UH HIE HDL CHOLESTEROL: 70 MG/DL (ref 40–60)
NON-UH HIE HEPATITIS C ANTIBODY: NONREACTIVE
NON-UH HIE HGB A1C: 5.1 %
NON-UH HIE IMMATURE RETIC FRACTION: 0.43 (ref 0.2–0.5)
NON-UH HIE IRON: 34 UG/DL (ref 50–170)
NON-UH HIE K: 4.2 MMOL/L (ref 3.5–5.1)
NON-UH HIE MAGNESIUM: 2.1 MG/DL (ref 1.6–2.6)
NON-UH HIE NA: 140 MMOL/L (ref 135–145)
NON-UH HIE RBC: 4.16 X10 (ref 4.2–5.4)
NON-UH HIE RETIC ABSOLUTE: 65 X10 (ref 22–110)
NON-UH HIE RETIC CORRECTED: ABNORMAL %
NON-UH HIE RETIC COUNT: 1.6 % (ref 0.5–2.5)
NON-UH HIE SATURATION: 9.2 % (ref 20–50)
NON-UH HIE TIBC: 368 UG/ML (ref 250–425)
NON-UH HIE TOTAL CHOL/HDL CHOL RATIO: 2.9
NON-UH HIE TOTAL PROTEIN: 7.3 G/DL (ref 5.7–8.2)
NON-UH HIE TRIGLYCERIDES: 79 MG/DL (ref 30–150)
NON-UH HIE TSH: 1.5 UIU/ML (ref 0.55–4.78)
NON-UH HIE URIC ACID: 4.5 MG/DL (ref 3.1–7.8)
NON-UH HIE VITAMIN B12: 377 PG/ML (ref 211–911)

## 2024-09-17 ENCOUNTER — TELEPHONE (OUTPATIENT)
Dept: PRIMARY CARE | Facility: CLINIC | Age: 54
End: 2024-09-17
Payer: COMMERCIAL

## 2024-09-17 NOTE — TELEPHONE ENCOUNTER
----- Message from Han Caldera sent at 9/16/2024  9:32 AM EDT -----  Cholesterol high 205 ferritin low 6 iron low 34 saturation low 9.6 mild anemia    Advised low-fat diet    Slow Fe twice a day for 2 months vitamin C twice a day for 2 months follow-up 2 months

## 2024-10-22 ENCOUNTER — HOSPITAL ENCOUNTER (OUTPATIENT)
Dept: RADIOLOGY | Facility: CLINIC | Age: 54
Discharge: HOME | End: 2024-10-22
Payer: COMMERCIAL

## 2024-10-22 DIAGNOSIS — E03.9 ACQUIRED HYPOTHYROIDISM: ICD-10-CM

## 2024-10-22 DIAGNOSIS — M45.0 ANKYLOSING SPONDYLITIS OF MULTIPLE SITES IN SPINE (MULTI): ICD-10-CM

## 2024-10-22 DIAGNOSIS — Z23 NEED FOR INFLUENZA VACCINATION: ICD-10-CM

## 2024-10-22 DIAGNOSIS — G62.9 NEUROPATHY: ICD-10-CM

## 2024-10-22 DIAGNOSIS — E78.2 HYPERLIPEMIA, MIXED: ICD-10-CM

## 2024-10-22 DIAGNOSIS — Z00.00 HEALTH CARE MAINTENANCE: ICD-10-CM

## 2024-10-22 DIAGNOSIS — D50.0 IRON DEFICIENCY ANEMIA DUE TO CHRONIC BLOOD LOSS: ICD-10-CM

## 2024-12-10 ENCOUNTER — APPOINTMENT (OUTPATIENT)
Dept: PRIMARY CARE | Facility: CLINIC | Age: 54
End: 2024-12-10
Payer: COMMERCIAL

## 2024-12-10 VITALS
HEIGHT: 67 IN | HEART RATE: 79 BPM | DIASTOLIC BLOOD PRESSURE: 76 MMHG | OXYGEN SATURATION: 97 % | TEMPERATURE: 97.2 F | BODY MASS INDEX: 25.74 KG/M2 | SYSTOLIC BLOOD PRESSURE: 127 MMHG | WEIGHT: 164 LBS

## 2024-12-10 DIAGNOSIS — E78.2 HYPERLIPEMIA, MIXED: ICD-10-CM

## 2024-12-10 DIAGNOSIS — Z13.820 SCREENING FOR OSTEOPOROSIS: ICD-10-CM

## 2024-12-10 DIAGNOSIS — D50.0 IRON DEFICIENCY ANEMIA DUE TO CHRONIC BLOOD LOSS: ICD-10-CM

## 2024-12-10 DIAGNOSIS — E03.9 ACQUIRED HYPOTHYROIDISM: Primary | ICD-10-CM

## 2024-12-10 PROCEDURE — 3008F BODY MASS INDEX DOCD: CPT | Performed by: INTERNAL MEDICINE

## 2024-12-10 PROCEDURE — 99214 OFFICE O/P EST MOD 30 MIN: CPT | Performed by: INTERNAL MEDICINE

## 2024-12-10 PROCEDURE — 1036F TOBACCO NON-USER: CPT | Performed by: INTERNAL MEDICINE

## 2024-12-10 RX ORDER — LEVOTHYROXINE SODIUM 75 UG/1
75 TABLET ORAL DAILY
Qty: 90 TABLET | Refills: 1 | Status: SHIPPED | OUTPATIENT
Start: 2024-12-10 | End: 2024-12-12 | Stop reason: SDUPTHER

## 2024-12-10 NOTE — PROGRESS NOTES
Subjective   Patient ID: Deidra Chacon is a 54 y.o. female who presents for Follow-up (3 month /Osteoporosis would like to be checked/Discuss weight /Brown spots on chest wants to talk about/).    Assessment/Plan     Problem List Items Addressed This Visit       Hyperlipemia, mixed    Relevant Orders    TSH with reflex to Free T4 if abnormal    Iron and TIBC    Ferritin    Lipid Panel    Comprehensive Metabolic Panel    CK    Acquired hypothyroidism - Primary    Relevant Medications    levothyroxine (Synthroid, Levoxyl) 75 mcg tablet    Other Relevant Orders    TSH with reflex to Free T4 if abnormal    Iron and TIBC    Ferritin    Lipid Panel    Comprehensive Metabolic Panel    CK    Iron deficiency anemia due to chronic blood loss    Relevant Orders    TSH with reflex to Free T4 if abnormal    Iron and TIBC    Ferritin    Lipid Panel    Comprehensive Metabolic Panel    CK    Screening for osteoporosis    Relevant Orders    XR DEXA bone density     Patient was evaluated today, problem list was reviewed, problems and concerns addressed, Rx list reviewed and updated, lab and tests were noted and reviewed. Life style changes were discussed, always it works better if we eat plant based diet and plenty of fibres and roughage. Consume adequate amount of water and avoid alcohol, light to moderate physical activities and stress reduction are always beneficial for ongoing physical well being. Do not forget to have 6 to 7 hours of sleep regularly and avoid late night jeremy screen exposure.    HPI Deidra Chacon is a 54 y.o. female who presents for Follow-up (3 month /Osteoporosis would like to be checked/Discuss weight /Brown spots on chest wants to talk about/).  Discussed about autoimmune arthritis osteoporosis and skin lesion advised DEXA scan dermatology evaluation    Obesity with uncontrolled hypothyroidism increase levothyroxine to 75 mcg a day    Hypertension hyperlipidemia osteopenia osteoarthritis fibromyalgia patient will  continue low-fat diet Zyrtec Celebrex and vitamin C vitamin D calcium supplement discussed about Cymbalta benefit to the mental physical and fibromyalgia pain    At age of 54 female skin cancer breast cancer cervical cancer colon cancer screening DEXA scan screening follow-up recommend review lab and medication  Past Medical History:   Diagnosis Date    Candidiasis, unspecified 08/06/2020    Yeast infection    Cough, unspecified 08/01/2016    Cough    COVID-19     VACCINATED    Hyperlipidemia     Hypothyroidism     Inflammatory polyarthropathy (Multi) 09/12/2019    Polyarthritis, inflammatory    Other specified complications of surgical and medical care, not elsewhere classified, initial encounter 12/02/2021    Fluid collection at surgical site    Otitis media, unspecified, unspecified ear 01/12/2016    Ear infection    Pain in left hip 05/22/2018    Left hip pain    Pain in right knee 09/21/2016    Right knee pain    Pain in right shoulder 09/12/2019    Right shoulder pain, unspecified chronicity    Pelvic and perineal pain 10/14/2021    Female pelvic pain    Personal history of diseases of the blood and blood-forming organs and certain disorders involving the immune mechanism 09/12/2019    History of autoimmune disorder    Personal history of other diseases of the digestive system 12/02/2021    History of umbilical hernia    Personal history of other diseases of the digestive system 12/10/2020    History of hemorrhoids    Personal history of other diseases of the musculoskeletal system and connective tissue 06/17/2019    History of low back pain    Personal history of other diseases of the musculoskeletal system and connective tissue 06/23/2016    History of bursitis    Personal history of other diseases of the nervous system and sense organs 09/02/2021    History of carpal tunnel syndrome    Personal history of other endocrine, nutritional and metabolic disease 12/17/2019    History of vitamin D deficiency     Personal history of other mental and behavioral disorders 07/27/2020    History of depression    Personal history of other specified conditions     History of abdominal pain    Personal history of urinary (tract) infections     History of urinary tract infection    Radiculopathy, cervical region 09/09/2019    Cervical radiculopathy    Radiculopathy, lumbar region 06/17/2019    Lumbar radiculopathy    Radiculopathy, lumbar region 09/09/2019    Chronic lumbar radiculopathy    Spinal stenosis, lumbar region without neurogenic claudication 01/19/2021    Spinal stenosis of lumbar region without neurogenic claudication    Unspecified urinary incontinence 10/07/2021    Urinary incontinence in female    Urge incontinence 10/07/2021    Urge incontinence     Past Surgical History:   Procedure Laterality Date    HYSTERECTOMY  08/01/2016    Hysterectomy    LASIK      OTHER SURGICAL HISTORY Right 10/07/2021    Carpal tunnel surgery    OTHER SURGICAL HISTORY  10/07/2021    Vaginal sling procedure    OTHER SURGICAL HISTORY  12/02/2021    Umbilical hernia repair laparoscopic    SINUS SURGERY  08/01/2016    Sinus Surgery     Allergies   Allergen Reactions    Cephalexin Other     yeast infections    Oseltamivir Itching and Rash     Current Outpatient Medications   Medication Sig Dispense Refill    atorvastatin (Lipitor) 10 mg tablet Take 1 tablet (10 mg) by mouth once daily. (Patient taking differently: Take 1 tablet (10 mg) by mouth every other day.) 90 tablet 3    CeleBREX 200 mg capsule Take 1 capsule (200 mg) by mouth every other day.      cetirizine (ZyrTEC) 10 mg tablet Take 1 tablet (10 mg) by mouth once daily. 90 tablet 3    DULoxetine (Cymbalta) 30 mg DR capsule Take 1 capsule (30 mg) by mouth once daily in the evening. 90 capsule 3    ergocalciferol (Vitamin D-2) 1.25 MG (12182 UT) capsule Take 1 capsule (1,250 mcg) by mouth 1 (one) time per week.      fluticasone (Flonase) 50 mcg/actuation nasal spray Administer 2 sprays  into each nostril once daily. 16 g 6    mv-calcium-min-iron fm-FA-vitK (Multi For Her) 18 mg iron-600 mcg-80 mcg tablet as directed      levothyroxine (Synthroid, Levoxyl) 75 mcg tablet Take 1 tablet (75 mcg) by mouth early in the morning.. take one tablet (50mcg) by mouth once daily 90 tablet 1     No current facility-administered medications for this visit.     Family History   Problem Relation Name Age of Onset    COPD Mother          severe    Other (cardiac disorder) Father      Diabetes Father      Migraines Father      Cancer Maternal Grandmother      Cancer Maternal Grandfather      Heart failure Paternal Grandmother      Heart attack Paternal Grandfather       Social History     Socioeconomic History    Marital status:    Tobacco Use    Smoking status: Never    Smokeless tobacco: Never   Vaping Use    Vaping status: Never Used   Substance and Sexual Activity    Alcohol use: Yes     Comment: occasional    Drug use: Never    Sexual activity: Yes     Partners: Male     Immunization History   Administered Date(s) Administered    Flu vaccine (IIV4), preservative free *Check age/dose* 10/03/2016, 09/07/2017, 09/17/2018, 08/25/2020, 10/25/2022    Flu vaccine, quadrivalent, no egg protein, age 6 month or greater (FLUCELVAX) 11/01/2021, 10/24/2023    Flu vaccine, trivalent, preservative free, no egg protein, age 18y+ (Flublok) 09/12/2024    Influenza, Unspecified 09/09/2019    Influenza, seasonal, injectable 10/25/2022    Moderna SARS-CoV-2 Vaccination 03/18/2021, 04/15/2021, 01/06/2022    Pneumococcal polysaccharide vaccine, 23-valent, age 2 years and older (PNEUMOVAX 23) 09/09/2019    Tdap vaccine, age 7 year and older (BOOSTRIX, ADACEL) 02/07/2017    Zoster vaccine, recombinant, adult (SHINGRIX) 09/09/2019, 02/07/2020, 06/07/2020    Zoster, Unspecified 02/07/2020, 06/07/2020       Review of Systems  Review of systems is otherwise negative unless stated above or in history of present illness.    Objective  "  Visit Vitals  /76   Pulse 79   Temp 36.2 °C (97.2 °F)   Ht 1.702 m (5' 7\")   Wt 74.4 kg (164 lb)   SpO2 97%   BMI 25.69 kg/m²   OB Status Hysterectomy   Smoking Status Never   BSA 1.88 m²     Physical Exam  Constitutional: Anxiety about fibromyalgia     General: not in acute distress.   HENT:      Head: Normocephalic and atraumatic.      Nose: Nose normal.   Eyes:      Extraocular Movements: Extraocular movements intact.      Conjunctiva/sclera: Conjunctivae normal.   Cardiovascular:      Rate and Rhythm: Normal rate ,  No M/R/G  Pulmonary: Rhonchi     Effort: Pulmonary effort is normal.      Breath sounds: Normal, Bilat Equal AE  Skin: Multiple benign looking skin lesion     General: Skin is warm.   Neurological:      Mental Status: He is alert and oriented to person, place, and time.   Psychiatric:         Mood and Affect: Mood normal.         Behavior: Behavior normal.   Musculoskeletal osteopenia osteoarthritis  FROM in all extremitirs,  Joint-no swelling or tenderness    Orders Only on 09/13/2024   Component Date Value Ref Range Status    [facility] Immature Retic Fraction 09/13/2024 0.43  0.20 - 0.50 Final    [facility] Retic Count 09/13/2024 1.6  0.5 - 2.5 % Final    [facility] RBC 09/13/2024 4.16 (L)  4.20 - 5.40 x10 Final    [facility] Retic Absolute 09/13/2024 65  22 - 110 x10 Final    [facility] HCT 09/13/2024 35.3 (L)  36.0 - 46.0 % Final    [facility] Retic Corrected 09/13/2024 NA  % Final    [facility] HGB A1C 09/13/2024 5.1  % Final    [facility] TIBC 09/13/2024 368  250 - 425 ug/ml Final    [facility] IRON 09/13/2024 34 (L)  50 - 170 ug/dl Final    [facility] Saturation 09/13/2024 9.2 (L)  20.0 - 50.0 % Final    [facility] Haptoglobin 09/13/2024 205 (H)  30 - 200 mg/dL Final    [facility] Magnesium 09/13/2024 2.1  1.6 - 2.6 mg/dL Final    [facility] Uric Acid 09/13/2024 4.5  3.1 - 7.8 mg/dL Final    [facility] FERRITIN 09/13/2024 6 (L)  10 - 291 ng/mL Final    [facility] FOLATE 09/13/2024 " 14.7  5.4 - 17.5 ng/mL Final    [facility] GOT 09/13/2024 16  15 - 37 unit/L Final    [facility] Glucose 09/13/2024 90  74 - 106 mg/dL Final    [facility] ALB 09/13/2024 3.9  3.4 - 5.0 g/dL Final    [facility] Chloride 09/13/2024 106  98 - 107 mmol/L Final    [facility] Bilirubin, Total 09/13/2024 0.40  0.30 - 1.20 mg/dL Final    [facility] GFR AA 09/13/2024 >60   Final    [facility] BUN/Creat Ratio 09/13/2024 12.5   Final    [facility] Na 09/13/2024 140  135 - 145 mmol/L Final    [facility] A/G Ratio 09/13/2024 1.1   Final    [facility] Creatinine 09/13/2024 0.8  0.5 - 0.8 mg/dL Final    [facility] GPT 09/13/2024 12  10 - 49 unit/L Final    [facility] Alk Phos 09/13/2024 103  45 - 117 unit/L Final    [facility] Total Protein 09/13/2024 7.3  5.7 - 8.2 g/dL Final    [facility] CO2, venous 09/13/2024 29.0  20.0 - 31.0 mmol/L Final    [facility] K 09/13/2024 4.2  3.5 - 5.1 mmol/L Final    [facility] Calculated Osmolality 09/13/2024 278  275 - 295 mOsm/kg Final    [facility] Glomerular Filtration R* 09/13/2024 >60  mL/min/1.73m? Final    [facility] Calcium 09/13/2024 9.5  8.7 - 10.4 mg/dL Final    [facility] BUN 09/13/2024 10  9 - 23 mg/dL Final    [facility] Globulin 09/13/2024 3.4  g/dL Final    [facility] Vitamin B12 09/13/2024 377  211 - 911 pg/mL Final    [facility] TSH 09/13/2024 1.50  0.55 - 4.78 uIU/ml Final    [facility] CPK 09/13/2024 73  34 - 145 unit/L Final    [facility] Total Chol/HDL Chol Rat* 09/13/2024 2.9   Final    [facility] Triglycerides 09/13/2024 79  30 - 150 mg/dL Final    [facility] Cholesterol 09/13/2024 205 (H)  100 - 200 mg/dL Final    [facility] Calculated LDL Choleste* 09/13/2024 119  60 - 130 mg/dL Final    [facility] HDL Cholesterol 09/13/2024 70 (H)  40 - 60 mg/dL Final    [facility] Hepatitis C Antibody 09/13/2024 Nonreactive   Final       Radiology: Reviewed imaging in powerchart.  No results found.      Charting was completed using voice recognition technology and may include  unintended errors.

## 2024-12-12 DIAGNOSIS — E03.9 ACQUIRED HYPOTHYROIDISM: ICD-10-CM

## 2024-12-12 RX ORDER — LEVOTHYROXINE SODIUM 75 UG/1
75 TABLET ORAL DAILY
Qty: 90 TABLET | Refills: 1 | Status: SHIPPED | OUTPATIENT
Start: 2024-12-12

## 2024-12-17 ENCOUNTER — TELEPHONE (OUTPATIENT)
Dept: PRIMARY CARE | Facility: CLINIC | Age: 54
End: 2024-12-17
Payer: COMMERCIAL

## 2024-12-17 LAB
NON-UH HIE A/G RATIO: 1.2
NON-UH HIE ALB: 3.8 G/DL (ref 3.4–5)
NON-UH HIE ALK PHOS: 94 UNIT/L (ref 45–117)
NON-UH HIE BILIRUBIN, TOTAL: 0.4 MG/DL (ref 0.3–1.2)
NON-UH HIE BUN/CREAT RATIO: 18.8
NON-UH HIE BUN: 15 MG/DL (ref 9–23)
NON-UH HIE CALCIUM: 10.1 MG/DL (ref 8.7–10.4)
NON-UH HIE CALCULATED LDL CHOLESTEROL: 110 MG/DL (ref 60–130)
NON-UH HIE CALCULATED OSMOLALITY: 280 MOSM/KG (ref 275–295)
NON-UH HIE CHLORIDE: 106 MMOL/L (ref 98–107)
NON-UH HIE CHOLESTEROL: 202 MG/DL (ref 100–200)
NON-UH HIE CO2, VENOUS: 30 MMOL/L (ref 20–31)
NON-UH HIE CPK: 69 UNIT/L (ref 34–145)
NON-UH HIE CREATININE: 0.8 MG/DL (ref 0.5–0.8)
NON-UH HIE FERRITIN: 22 NG/ML (ref 10–291)
NON-UH HIE GFR AA: >60
NON-UH HIE GLOBULIN: 3.3 G/DL
NON-UH HIE GLOMERULAR FILTRATION RATE: >60 ML/MIN/1.73M?
NON-UH HIE GLUCOSE: 96 MG/DL (ref 74–106)
NON-UH HIE GOT: 15 UNIT/L (ref 15–37)
NON-UH HIE GPT: 10 UNIT/L (ref 10–49)
NON-UH HIE HDL CHOLESTEROL: 76 MG/DL (ref 40–60)
NON-UH HIE IRON: 101 UG/DL (ref 50–170)
NON-UH HIE K: 4.4 MMOL/L (ref 3.5–5.1)
NON-UH HIE NA: 140 MMOL/L (ref 135–145)
NON-UH HIE SATURATION: 30.1 % (ref 20–50)
NON-UH HIE TIBC: 335 UG/ML (ref 250–425)
NON-UH HIE TOTAL CHOL/HDL CHOL RATIO: 2.7
NON-UH HIE TOTAL PROTEIN: 7.1 G/DL (ref 5.7–8.2)
NON-UH HIE TRIGLYCERIDES: 82 MG/DL (ref 30–150)
NON-UH HIE TSH: 1.61 UIU/ML (ref 0.55–4.78)

## 2024-12-17 NOTE — TELEPHONE ENCOUNTER
----- Message from Han Caldera sent at 12/17/2024 11:32 AM EST -----    IMPRESSION: Osteopenia based on BMD.     Fracture risk is increased.     Increased risk based on low BMD and history of fracture.     Advised Citracal 2 tablet a day for osteopenia

## 2024-12-17 NOTE — TELEPHONE ENCOUNTER
----- Message from Han Caldera sent at 12/17/2024  2:25 PM EST -----  Continue low-fat low-carb diet follow-up total cholesterol 202 better than before

## 2024-12-24 ENCOUNTER — LAB (OUTPATIENT)
Dept: LAB | Facility: LAB | Age: 54
End: 2024-12-24
Payer: COMMERCIAL

## 2024-12-24 ENCOUNTER — APPOINTMENT (OUTPATIENT)
Dept: RHEUMATOLOGY | Facility: CLINIC | Age: 54
End: 2024-12-24
Payer: COMMERCIAL

## 2024-12-24 VITALS
SYSTOLIC BLOOD PRESSURE: 119 MMHG | HEART RATE: 70 BPM | WEIGHT: 168.38 LBS | HEIGHT: 67 IN | TEMPERATURE: 97.5 F | DIASTOLIC BLOOD PRESSURE: 79 MMHG | BODY MASS INDEX: 26.43 KG/M2 | OXYGEN SATURATION: 98 % | RESPIRATION RATE: 16 BRPM

## 2024-12-24 DIAGNOSIS — M19.90 ARTHRITIS: ICD-10-CM

## 2024-12-24 DIAGNOSIS — M19.90 ARTHRITIS: Primary | ICD-10-CM

## 2024-12-24 PROBLEM — M25.541 ARTHRALGIA OF BOTH HANDS: Status: ACTIVE | Noted: 2024-12-24

## 2024-12-24 PROBLEM — M25.542 ARTHRALGIA OF BOTH HANDS: Status: ACTIVE | Noted: 2024-12-24

## 2024-12-24 LAB
BASOPHILS # BLD AUTO: 0.04 X10*3/UL (ref 0–0.1)
BASOPHILS NFR BLD AUTO: 0.7 %
CCP IGG SERPL-ACNC: <1 U/ML
CENTROMERE B AB SER-ACNC: <0.2 AI
CHROMATIN AB SERPL-ACNC: <0.2 AI
CRP SERPL-MCNC: 0.33 MG/DL
DSDNA AB SER-ACNC: 5 IU/ML
ENA JO1 AB SER QL IA: <0.2 AI
ENA RNP AB SER IA-ACNC: 4.2 AI
ENA SCL70 AB SER QL IA: <0.2 AI
ENA SM AB SER IA-ACNC: <0.2 AI
ENA SM+RNP AB SER QL IA: <0.2 AI
ENA SS-A AB SER IA-ACNC: <0.2 AI
ENA SS-B AB SER IA-ACNC: <0.2 AI
EOSINOPHIL # BLD AUTO: 0.06 X10*3/UL (ref 0–0.7)
EOSINOPHIL NFR BLD AUTO: 1 %
ERYTHROCYTE [DISTWIDTH] IN BLOOD BY AUTOMATED COUNT: 13 % (ref 11.5–14.5)
ERYTHROCYTE [SEDIMENTATION RATE] IN BLOOD BY WESTERGREN METHOD: 13 MM/H (ref 0–30)
HCT VFR BLD AUTO: 42.8 % (ref 36–46)
HGB BLD-MCNC: 13.9 G/DL (ref 12–16)
IMM GRANULOCYTES # BLD AUTO: 0.01 X10*3/UL (ref 0–0.7)
IMM GRANULOCYTES NFR BLD AUTO: 0.2 % (ref 0–0.9)
LYMPHOCYTES # BLD AUTO: 1.67 X10*3/UL (ref 1.2–4.8)
LYMPHOCYTES NFR BLD AUTO: 27.5 %
MCH RBC QN AUTO: 28.9 PG (ref 26–34)
MCHC RBC AUTO-ENTMCNC: 32.5 G/DL (ref 32–36)
MCV RBC AUTO: 89 FL (ref 80–100)
MONOCYTES # BLD AUTO: 0.48 X10*3/UL (ref 0.1–1)
MONOCYTES NFR BLD AUTO: 7.9 %
NEUTROPHILS # BLD AUTO: 3.81 X10*3/UL (ref 1.2–7.7)
NEUTROPHILS NFR BLD AUTO: 62.7 %
NRBC BLD-RTO: 0 /100 WBCS (ref 0–0)
PLATELET # BLD AUTO: 393 X10*3/UL (ref 150–450)
RBC # BLD AUTO: 4.81 X10*6/UL (ref 4–5.2)
RHEUMATOID FACT SER NEPH-ACNC: <10 IU/ML (ref 0–15)
RIBOSOMAL P AB SER-ACNC: <0.2 AI
WBC # BLD AUTO: 6.1 X10*3/UL (ref 4.4–11.3)

## 2024-12-24 PROCEDURE — 86200 CCP ANTIBODY: CPT

## 2024-12-24 PROCEDURE — 86431 RHEUMATOID FACTOR QUANT: CPT

## 2024-12-24 PROCEDURE — 86038 ANTINUCLEAR ANTIBODIES: CPT

## 2024-12-24 PROCEDURE — 99205 OFFICE O/P NEW HI 60 MIN: CPT | Performed by: INTERNAL MEDICINE

## 2024-12-24 PROCEDURE — 86225 DNA ANTIBODY NATIVE: CPT

## 2024-12-24 PROCEDURE — 1036F TOBACCO NON-USER: CPT | Performed by: INTERNAL MEDICINE

## 2024-12-24 PROCEDURE — 86140 C-REACTIVE PROTEIN: CPT

## 2024-12-24 PROCEDURE — 86235 NUCLEAR ANTIGEN ANTIBODY: CPT

## 2024-12-24 PROCEDURE — 3008F BODY MASS INDEX DOCD: CPT | Performed by: INTERNAL MEDICINE

## 2024-12-24 PROCEDURE — 85652 RBC SED RATE AUTOMATED: CPT

## 2024-12-24 PROCEDURE — 85025 COMPLETE CBC W/AUTO DIFF WBC: CPT

## 2024-12-24 ASSESSMENT — ENCOUNTER SYMPTOMS
LOSS OF SENSATION IN FEET: 0
OCCASIONAL FEELINGS OF UNSTEADINESS: 0
DEPRESSION: 0

## 2024-12-24 ASSESSMENT — PATIENT HEALTH QUESTIONNAIRE - PHQ9
2. FEELING DOWN, DEPRESSED OR HOPELESS: NOT AT ALL
SUM OF ALL RESPONSES TO PHQ9 QUESTIONS 1 AND 2: 0
1. LITTLE INTEREST OR PLEASURE IN DOING THINGS: NOT AT ALL

## 2024-12-24 ASSESSMENT — PAIN SCALES - GENERAL: PAINLEVEL_OUTOF10: 0-NO PAIN

## 2024-12-24 NOTE — PATIENT INSTRUCTIONS
Check CBC with diff, DARRON with panel, rheumatoid factor, citrulline antibody, ESR and CRP.  Follow-up in 3 months.

## 2024-12-24 NOTE — PROGRESS NOTES
Subjective   Patient ID: Deidra Chacon is a 54 y.o. female who presents for second opinion regarding joint pain (referred from Dr. Caldera).    HPI 54-year-old female here for evaluation of joint pain. (Referred from Dr. Caldera).   The patient has complaints of joint pain dating back to about 2009.  I reviewed office visit note from Dr. Worrell from October 2011.  At that time the patient had a history of DARRON 1:40, RNP+, and episodic joint pain.    The patient was having left hip pain in 2011.  MRI of the left hip showed a subchondral cyst.    Most recently the patient has been seeing Dr. Stokes for rheumatologic evaluation.    Through the years, she has also had neck and low back pain.  Dr. Stokes at 1 point thought perhaps she has ankylosing spondylitis.  She had previous RFA of L4-5 and SI joints by Dr. De Leon, which did help.  She relates that she has not had back pain recently.    She did see Dr. Soto regarding low back pain in 2018.  She was diagnosed with degenerative disc disease at L3-4 and L4-5.  She also had bilateral lateral recess narrowing at L4-5.    She previously tried sulfasalazine which did not help.  She takes Celebrex as needed which does help.  She took hydroxychloroquine in 2019, then took it again earlier this year.  She stopped hydroxychloroquine in 6 to 8 weeks ago because she was not sure it was helping.    She was recently given a prescription for leflunomide from Dr. Stokes, which she has not started.    She sometimes gets pain in her left index finger and left third finger PIP joints, as well as the IP joint of her left thumb.  She denies morning stiffness.    She was also previously diagnosed with fibromyalgia, for which she takes duloxetine 30 mg daily.    She denies chronic insomnia or IBS symptoms.    She did have several previous injections in the left greater trochanter for trochanteric bursitis.  She had surgery March 2024 for trochanteric bursitis and removal of lipoma from  over the left lateral hip.  She notes that this did significantly help her pain.    She had a recent what sounds to be a avulsion fracture of her right lateral malleolus.  She was in a walking boot for a period of time, now has a brace on her ankle.    DEXA December 2024: T-score -1.6 lumbar spine, T-score normal left femoral neck and left total hip.    Labs March 2023: DARRON 1:160 (homogeneous pattern), RNP 4, double-stranded DNA 8 (5-9 equivocal, positive is greater than equal to 10), SSA and SSB antibodies negative, rheumatoid factor negative, ESR 35  Labs December 2024: CK 69, CMP normal, TSH 1.61, cholesterol 202, HDL 76, , triglycerides 82.    Medications:  Reviewed as documented    Medical problem list:  Hypothyroidism  fibromyalgia  Hyperlipidemia    Surgery:  Left hip bursectomy and removal of lipoma 3/24  Hysterectomy, unilateral oophorectomy (laparoscopy) 2014  Right foot surgery for neuroma 15 years ago  D and C, ablation 2009  Right hand carpal tunnel 2021  Umbilical hernia repair 2021    Social history:  .  Homemaker.  Denies use of tobacco. Drinks alcohol socially.    Family history:  Father- diabetes, CAD  Mother- COPD, osteoporosis  No family history of RA, SLE, or inflammatory arthritis.      Review of Systems  Constitutional: Denies fever, chills, fatigue or weight loss.  She has gained 25 pounds over the last 12 to 18 months.  Eyes: Denies red eyes, dry eyes, painful eyes, or blurred vision.  ENT: Denies sore throat or hoarseness.  Cardiovascular: Denies chest pain, palpitations, or ankle edema.  Respiratory: Denies cough or shortness of breath.  Gastrointestinal: Denies change in bowel habits, denies diarrhea, denies constipation, denies melena or bright red blood per rectum, denies abdominal pain.  Musculoskeletal: Gets episodic pain and left index and third finger PIP joints, left thumb IP joint.  Denies morning stiffness.  Left knee hurt 2 months ago but now feels better.  She  "gets occasional low back pain but has no back pain now.  Skin: Denies rashes, nodules or other skin lesions.  Fingers used to turn white in the cold 15 years ago, but they have no longer do.  Neurologic: Denies headaches, dizziness, weakness numbness or tingling.  Psychiatric: Denies confusion or sleep disturbance.  Endocrine: Denies goiter or thyroid disorder.  : Denies history of miscarriage.  Hematologic: Denies history of DVT.      Objective   /79 (BP Location: Left arm, Patient Position: Sitting, BP Cuff Size: Adult)   Pulse 70   Temp 36.4 °C (97.5 °F) (Skin)   Resp 16   Ht 1.702 m (5' 7\")   Wt 76.4 kg (168 lb 6.1 oz)   SpO2 98%   BMI 26.37 kg/m²     Physical Exam  General appearance: Well-nourished well-appearing.  HEENT: PERRL, EOMI. no malar rash.  Neck: Supple, no nodes.  CV: RRR, no MGR.  Lungs: Clear, no rales or wheezes.  Abdomen: Soft, nontender. No hepatosplenomegaly.  Extremities:  No cyanosis, clubbing, or edema.  MS: No synovitis.     Left SI joint slightly tender with palpation.     Normal lordotic curve.     Negative ELIF test.      He has relatively well-preserved range of motion with modified Schober test-extracts from 10 to 14.5 cm.  Skin: No rashes or nodules. No sclerodactyly.      Assessment/Plan   Problem List Items Addressed This Visit    None  Visit Diagnoses         Codes    Arthritis    -  Primary M19.90    Relevant Orders    C-Reactive Protein (Completed)    Sedimentation Rate (Completed)    DARRON + LOREE Panel    Rheumatoid factor (Completed)    Citrulline Antibody, IgG    CBC and Auto Differential (Completed)            54-year-old female with episodes of joint pain dating back to about 2009.  She has received previous diagnoses of possible fibromyalgia, ankylosing spondylitis, or mixed connective tissue disease.    Her low back pain apparently did previously respond to epidural injections at L4-5, as well as RFA of L4-5 and SI joints.  She is not currently having low " back pain or low back stiffness.  She has relatively normal range of motion of lumbar spine.  I think it is unlikely at this point that she has ankylosing spondylitis.    She does have a history of positive DARRON and positive RNP antibody (titer of 4).  She gets episodic joint pain but at this point is not really having widespread joint pain, which makes an active connective tissue disease (such ads MCTD) unlikely.   She stopped hydroxychloroquine 6 to 8 weeks ago because she did not think was helping and she was experiencing hair loss (which she thinks was from the hydroxychloroquine).    The patient likely does have fibromyalgia.  I recommend that she continue with duloxetine 30 mg daily.    Plan:  Check CBC with diff, DARRON with panel, rheumatoid factor, citrulline antibody, ESR and CRP.  Follow-up in 3 months.

## 2024-12-26 LAB
ANA PATTERN: ABNORMAL
ANA SER QL HEP2 SUBST: POSITIVE
ANA TITR SER IF: ABNORMAL {TITER}

## 2025-02-03 DIAGNOSIS — E78.2 MIXED HYPERLIPIDEMIA: ICD-10-CM

## 2025-02-03 RX ORDER — ATORVASTATIN CALCIUM 10 MG/1
10 TABLET, FILM COATED ORAL DAILY
Qty: 90 TABLET | Refills: 3 | Status: SHIPPED | OUTPATIENT
Start: 2025-02-03

## 2025-02-07 DIAGNOSIS — Z00.00 HEALTH CARE MAINTENANCE: ICD-10-CM

## 2025-02-07 RX ORDER — ERGOCALCIFEROL 1.25 MG/1
1 CAPSULE ORAL
Qty: 12 CAPSULE | Refills: 0 | Status: SHIPPED | OUTPATIENT
Start: 2025-02-09

## 2025-02-10 ENCOUNTER — OFFICE VISIT (OUTPATIENT)
Dept: PRIMARY CARE | Facility: CLINIC | Age: 55
End: 2025-02-10
Payer: COMMERCIAL

## 2025-02-10 ENCOUNTER — TELEPHONE (OUTPATIENT)
Dept: PRIMARY CARE | Facility: CLINIC | Age: 55
End: 2025-02-10

## 2025-02-10 VITALS
DIASTOLIC BLOOD PRESSURE: 79 MMHG | BODY MASS INDEX: 26.37 KG/M2 | HEIGHT: 67 IN | SYSTOLIC BLOOD PRESSURE: 138 MMHG | TEMPERATURE: 97.7 F | HEART RATE: 81 BPM | OXYGEN SATURATION: 97 %

## 2025-02-10 DIAGNOSIS — J02.9 PHARYNGITIS WITH VIRAL SYNDROME: Primary | ICD-10-CM

## 2025-02-10 DIAGNOSIS — E03.9 ACQUIRED HYPOTHYROIDISM: ICD-10-CM

## 2025-02-10 DIAGNOSIS — E78.2 HYPERLIPEMIA, MIXED: ICD-10-CM

## 2025-02-10 DIAGNOSIS — U07.1 COVID: ICD-10-CM

## 2025-02-10 DIAGNOSIS — B34.9 PHARYNGITIS WITH VIRAL SYNDROME: Primary | ICD-10-CM

## 2025-02-10 DIAGNOSIS — M19.90 ARTHRITIS: ICD-10-CM

## 2025-02-10 PROBLEM — Z23 NEED FOR INFLUENZA VACCINATION: Status: RESOLVED | Noted: 2024-09-12 | Resolved: 2025-02-10

## 2025-02-10 PROBLEM — M79.605 PAIN OF LEFT LOWER EXTREMITY: Status: RESOLVED | Noted: 2024-09-12 | Resolved: 2025-02-10

## 2025-02-10 PROBLEM — D50.0 IRON DEFICIENCY ANEMIA DUE TO CHRONIC BLOOD LOSS: Status: RESOLVED | Noted: 2023-03-21 | Resolved: 2025-02-10

## 2025-02-10 PROBLEM — M45.0 ANKYLOSING SPONDYLITIS OF MULTIPLE SITES IN SPINE (MULTI): Status: RESOLVED | Noted: 2024-03-11 | Resolved: 2025-02-10

## 2025-02-10 PROBLEM — Z13.820 SCREENING FOR OSTEOPOROSIS: Status: RESOLVED | Noted: 2024-03-11 | Resolved: 2025-02-10

## 2025-02-10 PROBLEM — G62.9 NEUROPATHY: Status: RESOLVED | Noted: 2024-09-12 | Resolved: 2025-02-10

## 2025-02-10 PROBLEM — M25.542 ARTHRALGIA OF BOTH HANDS: Status: RESOLVED | Noted: 2024-12-24 | Resolved: 2025-02-10

## 2025-02-10 PROBLEM — D17.24 BENIGN LIPOMATOUS NEOPLASM OF SKIN AND SUBCUTANEOUS TISSUE OF LEFT LEG: Status: RESOLVED | Noted: 2024-03-26 | Resolved: 2025-02-10

## 2025-02-10 PROBLEM — M25.541 ARTHRALGIA OF BOTH HANDS: Status: RESOLVED | Noted: 2024-12-24 | Resolved: 2025-02-10

## 2025-02-10 LAB
NON-UH HIE BASO COUNT: 0.04 X1000 (ref 0–0.2)
NON-UH HIE BASOS %: 0.9 %
NON-UH HIE COVID-19 BY PCR IP: POSITIVE
NON-UH HIE DIFF?: NO
NON-UH HIE EOS COUNT: 0.18 X1000 (ref 0–0.5)
NON-UH HIE EOSIN %: 3.7 %
NON-UH HIE HCT: 40.6 % (ref 36–46)
NON-UH HIE HGB: 13.4 G/DL (ref 12–16)
NON-UH HIE INSTR WBC: 4.8
NON-UH HIE LYMPH %: 31.8 %
NON-UH HIE LYMPH COUNT: 1.53 X1000 (ref 1.2–4.8)
NON-UH HIE MCH: 29.2 PG (ref 27–34)
NON-UH HIE MCHC: 32.9 G/DL (ref 32–37)
NON-UH HIE MCV: 88.8 FL (ref 80–100)
NON-UH HIE MONO %: 14.2 %
NON-UH HIE MONO COUNT: 0.68 X1000 (ref 0.1–1)
NON-UH HIE MPV: 8.4 FL (ref 7.4–10.4)
NON-UH HIE NEUTROPHIL %: 49.4 %
NON-UH HIE NEUTROPHIL COUNT (ANC): 2.38 X1000 (ref 1.4–8.8)
NON-UH HIE NUCLEATED RBC: 0 /100WBC
NON-UH HIE PLATELET: 308 X10 (ref 150–450)
NON-UH HIE RAPID INFLUENZA A ANTIGEN TEST: NEGATIVE
NON-UH HIE RAPID INFLUENZA B ANTIGEN TEST: NEGATIVE
NON-UH HIE RBC: 4.58 X10 (ref 4.2–5.4)
NON-UH HIE RDW: 13.6 % (ref 11.5–14.5)
NON-UH HIE RESPIRATORY SYNCYTIAL VIRUS: NEGATIVE
NON-UH HIE RFAB INT QC: PRESENT
NON-UH HIE RSV INT QC: PRESENT
NON-UH HIE WBC: 4.8 X10 (ref 4.5–11)

## 2025-02-10 PROCEDURE — 99213 OFFICE O/P EST LOW 20 MIN: CPT | Performed by: INTERNAL MEDICINE

## 2025-02-10 PROCEDURE — 1036F TOBACCO NON-USER: CPT | Performed by: INTERNAL MEDICINE

## 2025-02-10 RX ORDER — METHYLPREDNISOLONE 4 MG/1
TABLET ORAL
Qty: 21 TABLET | Refills: 0 | Status: SHIPPED | OUTPATIENT
Start: 2025-02-10 | End: 2025-02-16

## 2025-02-10 RX ORDER — AZITHROMYCIN 250 MG/1
TABLET, FILM COATED ORAL
Qty: 6 TABLET | Refills: 0 | Status: SHIPPED | OUTPATIENT
Start: 2025-02-10 | End: 2025-02-15

## 2025-02-10 ASSESSMENT — PATIENT HEALTH QUESTIONNAIRE - PHQ9
1. LITTLE INTEREST OR PLEASURE IN DOING THINGS: NOT AT ALL
2. FEELING DOWN, DEPRESSED OR HOPELESS: NOT AT ALL
SUM OF ALL RESPONSES TO PHQ9 QUESTIONS 1 AND 2: 0

## 2025-02-10 NOTE — TELEPHONE ENCOUNTER
----- Message from Han Caldera sent at 2/10/2025  5:11 PM EST -----  Advised hold Lipitor start on  [facility]

## 2025-02-10 NOTE — PROGRESS NOTES
Subjective   Patient ID: Deidra Chacon is a 55 y.o. female who presents for Fatigue (Since Friday ) and Cough (Drainage- light yellow mucus /Roof of mouth feels funny/Since Friday  ).    Assessment/Plan     Problem List Items Addressed This Visit       Hyperlipemia, mixed    Acquired hypothyroidism    Pharyngitis with viral syndrome - Primary    Relevant Medications    azithromycin (Zithromax) 250 mg tablet    methylPREDNISolone (Medrol Dospak) 4 mg tablets    Other Relevant Orders    CBC and Auto Differential    Sars-CoV-2 PCR    Influenza A, and B PCR    RSV PCR    Arthritis       HPIDabridget Chacon is a 55 y.o. female who presents for Fatigue (Since Friday ) and Cough (Drainage- light yellow mucus /Roof of mouth feels funny/Since Friday  ).  Complaining of cough congestion drainage ear pain sinus pain sore throat wheezing onset acutely duration 3 days progressive acutely aggravating factor cold weather immunocompromise host autoimmune arthritis hypothyroidism    Negative for fever hypoxia    Negative for bug bite    Negative for nausea vomiting diarrhea constipation because of chronic drainage and cough had a nonspecific muscle pain in the periumbilical area    Recently had a second of the rheumatology workup is ongoing    Diagnosis and plan hyperlipidemia arthritis anxiety hypothyroidism allergic rhinitis.  Infection acute pharyngitis given Zithromax Medrol Dosepak probiotics sent for the CBC with differential viral panel follow-up advised a probiotic  Past Medical History:   Diagnosis Date    Candidiasis, unspecified 08/06/2020    Yeast infection    Cough, unspecified 08/01/2016    Cough    COVID-19     VACCINATED    Hyperlipidemia     Hypothyroidism     Inflammatory polyarthropathy (Multi) 09/12/2019    Polyarthritis, inflammatory    Other specified complications of surgical and medical care, not elsewhere classified, initial encounter 12/02/2021    Fluid collection at surgical site    Otitis media, unspecified,  unspecified ear 01/12/2016    Ear infection    Pain in left hip 05/22/2018    Left hip pain    Pain in right knee 09/21/2016    Right knee pain    Pain in right shoulder 09/12/2019    Right shoulder pain, unspecified chronicity    Pelvic and perineal pain 10/14/2021    Female pelvic pain    Personal history of diseases of the blood and blood-forming organs and certain disorders involving the immune mechanism 09/12/2019    History of autoimmune disorder    Personal history of other diseases of the digestive system 12/02/2021    History of umbilical hernia    Personal history of other diseases of the digestive system 12/10/2020    History of hemorrhoids    Personal history of other diseases of the musculoskeletal system and connective tissue 06/17/2019    History of low back pain    Personal history of other diseases of the musculoskeletal system and connective tissue 06/23/2016    History of bursitis    Personal history of other diseases of the nervous system and sense organs 09/02/2021    History of carpal tunnel syndrome    Personal history of other endocrine, nutritional and metabolic disease 12/17/2019    History of vitamin D deficiency    Personal history of other mental and behavioral disorders 07/27/2020    History of depression    Personal history of other specified conditions     History of abdominal pain    Personal history of urinary (tract) infections     History of urinary tract infection    Radiculopathy, cervical region 09/09/2019    Cervical radiculopathy    Radiculopathy, lumbar region 06/17/2019    Lumbar radiculopathy    Radiculopathy, lumbar region 09/09/2019    Chronic lumbar radiculopathy    Spinal stenosis, lumbar region without neurogenic claudication 01/19/2021    Spinal stenosis of lumbar region without neurogenic claudication    Unspecified urinary incontinence 10/07/2021    Urinary incontinence in female    Urge incontinence 10/07/2021    Urge incontinence     Past Surgical History:    Procedure Laterality Date    HYSTERECTOMY  08/01/2016    Hysterectomy    LASIK      OTHER SURGICAL HISTORY Right 10/07/2021    Carpal tunnel surgery    OTHER SURGICAL HISTORY  10/07/2021    Vaginal sling procedure    OTHER SURGICAL HISTORY  12/02/2021    Umbilical hernia repair laparoscopic    SINUS SURGERY  08/01/2016    Sinus Surgery     Allergies   Allergen Reactions    Cephalexin Other     yeast infections    Oseltamivir Itching and Rash     Current Outpatient Medications   Medication Sig Dispense Refill    atorvastatin (Lipitor) 10 mg tablet Take 1 tablet (10 mg) by mouth once daily. 90 tablet 3    CeleBREX 200 mg capsule Take 1 capsule (200 mg) by mouth every other day. (Patient taking differently: Take 1 capsule (200 mg) by mouth if needed for moderate pain (4 - 6).)      cetirizine (ZyrTEC) 10 mg tablet Take 1 tablet (10 mg) by mouth once daily. 90 tablet 3    DULoxetine (Cymbalta) 30 mg DR capsule Take 1 capsule (30 mg) by mouth once daily in the evening. 90 capsule 3    ergocalciferol (Vitamin D-2) 1.25 MG (49258 UT) capsule Take 1 capsule (1,250 mcg) by mouth 1 (one) time per week. 12 capsule 0    fluticasone (Flonase) 50 mcg/actuation nasal spray Administer 2 sprays into each nostril once daily. 16 g 6    levothyroxine (Synthroid, Levoxyl) 75 mcg tablet Take 1 tablet (75 mcg) by mouth early in the morning.. 90 tablet 1    mv-calcium-min-iron fm-FA-vitK (Multi For Her) 18 mg iron-600 mcg-80 mcg tablet as directed      azithromycin (Zithromax) 250 mg tablet Take 2 tablets (500 mg) by mouth once daily for 1 day, THEN 1 tablet (250 mg) once daily for 4 days. Take 2 tabs (500 mg) by mouth today, than 1 daily for 4 days.. 6 tablet 0    methylPREDNISolone (Medrol Dospak) 4 mg tablets Take as directed on package. 21 tablet 0     No current facility-administered medications for this visit.     Family History   Problem Relation Name Age of Onset    COPD Mother          severe    Other (cardiac disorder) Father    "   Diabetes Father      Migraines Father      Cancer Maternal Grandmother      Cancer Maternal Grandfather      Heart failure Paternal Grandmother      Heart attack Paternal Grandfather       Social History     Socioeconomic History    Marital status:    Tobacco Use    Smoking status: Never    Smokeless tobacco: Never   Vaping Use    Vaping status: Never Used   Substance and Sexual Activity    Alcohol use: Yes     Comment: occasional    Drug use: Never    Sexual activity: Yes     Partners: Male     Immunization History   Administered Date(s) Administered    Flu vaccine (IIV4), preservative free *Check age/dose* 10/03/2016, 09/07/2017, 09/17/2018, 08/25/2020, 10/25/2022    Flu vaccine, quadrivalent, no egg protein, age 6 month or greater (FLUCELVAX) 11/01/2021, 10/24/2023    Flu vaccine, trivalent, preservative free, no egg protein, age 18y+ (Flublok) 09/12/2024    Influenza, Unspecified 09/09/2019    Influenza, seasonal, injectable 10/25/2022    Moderna SARS-CoV-2 Vaccination 03/18/2021, 04/15/2021, 01/06/2022    Pneumococcal polysaccharide vaccine, 23-valent, age 2 years and older (PNEUMOVAX 23) 09/09/2019    Tdap vaccine, age 7 year and older (BOOSTRIX, ADACEL) 02/07/2017    Zoster vaccine, recombinant, adult (SHINGRIX) 09/09/2019, 02/07/2020, 06/07/2020    Zoster, Unspecified 02/07/2020, 06/07/2020       Review of Systems  Review of systems is otherwise negative unless stated above or in history of present illness.    Objective   Visit Vitals  /79   Pulse 81   Temp 36.5 °C (97.7 °F)   Ht 1.702 m (5' 7\")   SpO2 97%   BMI 26.37 kg/m²   OB Status Hysterectomy   Smoking Status Never   BSA 1.9 m²     Physical Exam  Constitutional:       General: not in acute distress.   HENT: Ear canal tympanic membrane bulging with air-fluid sinus congestion sore throat no lymphadenopathy       Eyes:      Extraocular Movements: Extraocular movements intact.      Conjunctiva/sclera: Conjunctivae normal.   Cardiovascular: "      Rate and Rhythm: Normal rate ,  No M/R/G  Pulmonary: Crackles rales rhonchi     Effort: Pulmonary effort is normal.      Breath sounds: Normal, Bilat Equal AE  Skin:     General: Skin is warm.   Neurological:      Mental Status: He is alert and oriented to person, place, and time.   Psychiatric:         Mood and Affect: Mood normal.         Behavior: Behavior normal.   Musculoskeletal polyarthritis and myalgia    Lab on 12/24/2024   Component Date Value Ref Range Status    C-Reactive Protein 12/24/2024 0.33  <1.00 mg/dL Final    Sedimentation Rate 12/24/2024 13  0 - 30 mm/h Final    Rheumatoid Factor 12/24/2024 <10  0 - 15 IU/mL Final    Citrulline Antibody, IgG 12/24/2024 <1  <3 U/mL Final    WBC 12/24/2024 6.1  4.4 - 11.3 x10*3/uL Final    nRBC 12/24/2024 0.0  0.0 - 0.0 /100 WBCs Final    RBC 12/24/2024 4.81  4.00 - 5.20 x10*6/uL Final    Hemoglobin 12/24/2024 13.9  12.0 - 16.0 g/dL Final    Hematocrit 12/24/2024 42.8  36.0 - 46.0 % Final    MCV 12/24/2024 89  80 - 100 fL Final    MCH 12/24/2024 28.9  26.0 - 34.0 pg Final    MCHC 12/24/2024 32.5  32.0 - 36.0 g/dL Final    RDW 12/24/2024 13.0  11.5 - 14.5 % Final    Platelets 12/24/2024 393  150 - 450 x10*3/uL Final    Neutrophils % 12/24/2024 62.7  40.0 - 80.0 % Final    Immature Granulocytes %, Automated 12/24/2024 0.2  0.0 - 0.9 % Final    Lymphocytes % 12/24/2024 27.5  13.0 - 44.0 % Final    Monocytes % 12/24/2024 7.9  2.0 - 10.0 % Final    Eosinophils % 12/24/2024 1.0  0.0 - 6.0 % Final    Basophils % 12/24/2024 0.7  0.0 - 2.0 % Final    Neutrophils Absolute 12/24/2024 3.81  1.20 - 7.70 x10*3/uL Final    Immature Granulocytes Absolute, Au* 12/24/2024 0.01  0.00 - 0.70 x10*3/uL Final    Lymphocytes Absolute 12/24/2024 1.67  1.20 - 4.80 x10*3/uL Final    Monocytes Absolute 12/24/2024 0.48  0.10 - 1.00 x10*3/uL Final    Eosinophils Absolute 12/24/2024 0.06  0.00 - 0.70 x10*3/uL Final    Basophils Absolute 12/24/2024 0.04  0.00 - 0.10 x10*3/uL Final    DARRON  12/24/2024 Positive (A)  Negative Final    DARRON Pattern 12/24/2024 Homogeneous   Final    DARRON Titer 12/24/2024 1:160   Final    Anti-SM 12/24/2024 <0.2  <1.0 AI Final    Anti-RNP 12/24/2024 4.2 (H)  <1.0 AI Final    Anti-SM/RNP 12/24/2024 <0.2  <1.0 AI Final    Anti-SSA 12/24/2024 <0.2  <1.0 AI Final    Anti-SSB 12/24/2024 <0.2  <1.0 AI Final    Anti-SCL-70 12/24/2024 <0.2  <1.0 AI Final    Anti-DOUGLAS-1 IgG 12/24/2024 <0.2  <1.0 AI Final    Anti-Chromatin 12/24/2024 <0.2  <1.0 AI Final    Anti-Centromere 12/24/2024 <0.2  <1.0 AI Final    ANTI-RIBOSOMAL P 12/24/2024 <0.2  <1.0 AI Final    Anti-DNA (DS) 12/24/2024 5.0 (H)  <5.0 IU/mL Final   Orders Only on 12/17/2024   Component Date Value Ref Range Status    [facility] Glomerular Filtration R* 12/17/2024 >60  mL/min/1.73m? Final    [facility] Calculated Osmolality 12/17/2024 280  275 - 295 mOsm/kg Final    [facility] K 12/17/2024 4.4  3.5 - 5.1 mmol/L Final    [facility] Globulin 12/17/2024 3.3  g/dL Final    [facility] Calcium 12/17/2024 10.1  8.7 - 10.4 mg/dL Final    [facility] BUN 12/17/2024 15  9 - 23 mg/dL Final    [facility] GOT 12/17/2024 15  15 - 37 unit/L Final    [facility] ALB 12/17/2024 3.8  3.4 - 5.0 g/dL Final    [facility] Glucose 12/17/2024 96  74 - 106 mg/dL Final    [facility] GFR AA 12/17/2024 >60   Final    [facility] Bilirubin, Total 12/17/2024 0.40  0.30 - 1.20 mg/dL Final    [facility] Chloride 12/17/2024 106  98 - 107 mmol/L Final    [facility] A/G Ratio 12/17/2024 1.2   Final    [facility] BUN/Creat Ratio 12/17/2024 18.8   Final    [facility] Na 12/17/2024 140  135 - 145 mmol/L Final    [facility] GPT 12/17/2024 10  10 - 49 unit/L Final    [facility] Alk Phos 12/17/2024 94  45 - 117 unit/L Final    [facility] Creatinine 12/17/2024 0.8  0.5 - 0.8 mg/dL Final    [facility] Total Protein 12/17/2024 7.1  5.7 - 8.2 g/dL Final    [facility] CO2, venous 12/17/2024 30.0  20.0 - 31.0 mmol/L Final    [facility] CPK 12/17/2024 69  34 - 145 unit/L Final     [facility] Cholesterol 12/17/2024 202 (H)  100 - 200 mg/dL Final    [facility] Calculated LDL Choleste* 12/17/2024 110  60 - 130 mg/dL Final    [facility] HDL Cholesterol 12/17/2024 76 (H)  40 - 60 mg/dL Final    [facility] Total Chol/HDL Chol Rat* 12/17/2024 2.7   Final    [facility] Triglycerides 12/17/2024 82  30 - 150 mg/dL Final    [facility] FERRITIN 12/17/2024 22  10 - 291 ng/mL Final    [facility] TSH 12/17/2024 1.61  0.55 - 4.78 uIU/ml Final    [facility] Saturation 12/17/2024 30.1  20.0 - 50.0 % Final    [facility] TIBC 12/17/2024 335  250 - 425 ug/ml Final    [facility] IRON 12/17/2024 101  50 - 170 ug/dl Final       Radiology: Reviewed imaging in powerchart.  No results found.      Charting was completed using voice recognition technology and may include unintended errors.

## 2025-03-31 ENCOUNTER — APPOINTMENT (OUTPATIENT)
Dept: RHEUMATOLOGY | Facility: CLINIC | Age: 55
End: 2025-03-31
Payer: COMMERCIAL

## 2025-03-31 VITALS
SYSTOLIC BLOOD PRESSURE: 114 MMHG | DIASTOLIC BLOOD PRESSURE: 73 MMHG | OXYGEN SATURATION: 96 % | RESPIRATION RATE: 14 BRPM | BODY MASS INDEX: 26.34 KG/M2 | TEMPERATURE: 98 F | WEIGHT: 167.8 LBS | HEART RATE: 78 BPM | HEIGHT: 67 IN

## 2025-03-31 DIAGNOSIS — M79.7 FIBROMYALGIA: ICD-10-CM

## 2025-03-31 DIAGNOSIS — M19.90 ARTHRITIS: Primary | ICD-10-CM

## 2025-03-31 DIAGNOSIS — M35.1 MIXED CONNECTIVE TISSUE DISEASE (MULTI): ICD-10-CM

## 2025-03-31 PROCEDURE — 99214 OFFICE O/P EST MOD 30 MIN: CPT | Performed by: INTERNAL MEDICINE

## 2025-03-31 PROCEDURE — 3008F BODY MASS INDEX DOCD: CPT | Performed by: INTERNAL MEDICINE

## 2025-03-31 RX ORDER — HYDROXYCHLOROQUINE SULFATE 200 MG/1
300 TABLET, FILM COATED ORAL DAILY
Qty: 45 TABLET | Refills: 3 | Status: SHIPPED | OUTPATIENT
Start: 2025-03-31 | End: 2025-07-29

## 2025-03-31 ASSESSMENT — PAIN SCALES - GENERAL: PAINLEVEL_OUTOF10: 3

## 2025-03-31 ASSESSMENT — ENCOUNTER SYMPTOMS
LOSS OF SENSATION IN FEET: 0
DEPRESSION: 0
OCCASIONAL FEELINGS OF UNSTEADINESS: 0

## 2025-03-31 ASSESSMENT — PATIENT HEALTH QUESTIONNAIRE - PHQ9
1. LITTLE INTEREST OR PLEASURE IN DOING THINGS: NOT AT ALL
SUM OF ALL RESPONSES TO PHQ9 QUESTIONS 1 AND 2: 0
2. FEELING DOWN, DEPRESSED OR HOPELESS: NOT AT ALL

## 2025-03-31 NOTE — PROGRESS NOTES
Subjective   Patient ID: Deidra Chacon is a 55 y.o. female who presents for follow-up regarding fibromyalgia.    HPI 55-year-old female here for follow up regarding fibromyalgia.   The patient has complaints of joint pain dating back to about 2009.  I reviewed office visit note from Dr. Worrell from October 2011.  At that time the patient had a history of DARRON 1:40, RNP+, and episodic joint pain.    She was also previously diagnosed with fibromyalgia, for which she takes duloxetine 30 mg daily.    She had COVID 2/10/25.  Her symptoms consisted of congestion, loss of taste, and cough.    The patient was having left hip pain in 2011.  MRI of the left hip showed a subchondral cyst.    Most recently the patient has been seeing Dr. Stokes for rheumatologic evaluation.    Through the years, she has also had neck and low back pain.  Dr. Stokes at 1 point thought perhaps she has ankylosing spondylitis.  She had previous RFA of L4-5 and SI joints by Dr. De Leon, which did help.  She relates that she has not had back pain recently.    She did see Dr. Soto regarding low back pain in 2018.  She was diagnosed with degenerative disc disease at L3-4 and L4-5.  She also had bilateral lateral recess narrowing at L4-5.    She previously tried sulfasalazine which did not help.  She takes Celebrex as needed which does help.  She took hydroxychloroquine in 2019, then took it again earlier this year.  She stopped hydroxychloroquine 10/24 because she was not sure it was helping.    She fractured her right ankle November 2024 (avulsion fracture of lateral malleolus).  She still has some right medial ankle and midfoot pain.  She does have some pain in both feet, for which she sees Dr. Barajas in podiatry.  The foot pain comes and goes.    She has some triggering in her left third finger.    She has recently had some trouble sleeping, she wakes up in the middle of the night.    She did have several previous injections in the left greater  "trochanter for trochanteric bursitis.  She had surgery March 2024 for trochanteric bursitis and removal of lipoma from over the left lateral hip.  She notes that this did significantly help her pain.    DEXA December 2024: T-score -1.6 lumbar spine, T-score normal left femoral neck and left total hip.    Labs March 2023: DARRON 1:160 (homogeneous pattern), RNP 4, double-stranded DNA 8 (5-9 equivocal, positive is greater than equal to 10), SSA and SSB antibodies negative, rheumatoid factor negative, ESR 35  Labs December 2024: CK 69, CMP normal, TSH 1.61, cholesterol 202, HDL 76, , triglycerides 82.,  DARRON 1:160 (homogeneous), RNP 4.2, double-stranded DNA 5 (5-9 equivocal).  ESR 13, CRP 0.33 (normal less than 1), rheumatoid factor negative, Citrulline antibody negative  Labs 2/25: CBC normal    Medications:  Reviewed as documented    Medical problem list:  Hypothyroidism  fibromyalgia  Hyperlipidemia    Surgery:  Left hip bursectomy and removal of lipoma 3/24  Hysterectomy, unilateral oophorectomy (laparoscopy) 2014  Right foot surgery for neuroma 15 years ago  D and C, ablation 2009  Right hand carpal tunnel 2021  Umbilical hernia repair 2021    Social history:  .  Homemaker.  Denies use of tobacco. Drinks alcohol socially.    Family history:  Father- diabetes, CAD  Mother- COPD, osteoporosis  No family history of RA, SLE, or inflammatory arthritis.      Review of Systems  General: Denies fevers or chills.  HEENT: c/o mild dry eyes, denies dry mouth.  CV: Denies chest pain or palpitations.  Denies leg edema.  Lungs: Denies coughing or shortness of breath.  Skin: Denies rashes or nodules.  Denies alopecia or malar rash.  MS: Denies joint pain or joint swelling.    Objective   /73 (BP Location: Left arm, Patient Position: Sitting, BP Cuff Size: Adult)   Pulse 78   Temp 36.7 °C (98 °F) (Oral)   Resp 14   Ht 1.702 m (5' 7\")   Wt 76.1 kg (167 lb 12.8 oz)   SpO2 96%   BMI 26.28 kg/m²     Physical " Exam  General appearance: Well-nourished well-appearing.  HEENT: PERRL, EOMI. no malar rash.  Neck: Supple, no nodes.  CV: RRR, no MGR.  Lungs: Clear, no rales or wheezes.  Abdomen: Soft, nontender. No hepatosplenomegaly.  Extremities:  No cyanosis, clubbing, or edema.  MS: No synovitis.     Left SI joint slightly tender with palpation.     Normal lordotic curve.     Negative ELIF test.      He has relatively well-preserved range of motion with modified Schober test-extracts from 10 to 14.5 cm.  Skin: No rashes or nodules. No sclerodactyly.      Assessment/Plan     Problem List Items Addressed This Visit             ICD-10-CM    BMI 26.0-26.9,adult Z68.26    Arthritis - Primary M19.90    Fibromyalgia M79.7     Other Visit Diagnoses         Codes    Mixed connective tissue disease (Multi)     M35.1    Relevant Medications    hydroxychloroquine (PlaqueniL) 200 mg tablet    Other Relevant Orders    CBC and Auto Differential    Basic Metabolic Panel    Sedimentation Rate    Anti-DNA Antibody, Double-Stranded    C3 Complement    C4 Complement    Anti-Cardiolipin Antibody (IgA, IgG, and IgM)    Beta-2 Glycoprotein Antibodies    Lupus Anticoag. With Interpretation[Patterson]    Creatinine, Urine Random    Protein, Urine Random          54-year-old female with episodes of joint pain dating back to about 2009.  She has received previous diagnoses of possible fibromyalgia, ankylosing spondylitis, or mixed connective tissue disease.    I do think she likely has fibromyalgia, I do not think she has ankylosing spondylitis.  She has positive DARRON with positive RNP antibody of 4.2, which could be consistent with mixed connective tissue disease.  She did try hydroxychloroquine which did not seem to help, but she may have only been on it for a few months (I did explain it takes 3 to 6 months to get full response).  She is willing to try it again.  I discussed risk of retinopathy and need for periodic eye exams.    Her low back pain  apparently did previously respond to epidural injections at L4-5, as well as RFA of L4-5 and SI joints.  She is not currently having low back pain or low back stiffness.  She has relatively normal range of motion of lumbar spine.  I think it is unlikely at this point that she has ankylosing spondylitis.    The patient likely does have fibromyalgia.  I recommend that she continue with duloxetine 30 mg daily.    BMI 26- stable.    Plan:  Check labs (see orders).  Start hydroxychloroquine 300 mg daily.  Follow-up in 3 months.

## 2025-04-05 PROBLEM — M79.7 FIBROMYALGIA: Status: ACTIVE | Noted: 2025-04-05

## 2025-05-09 LAB
NON-UH HIE BASO COUNT: 0.07 X1000 (ref 0–0.2)
NON-UH HIE BASOS %: 1.2 %
NON-UH HIE BUN/CREAT RATIO: 17.1
NON-UH HIE BUN: 12 MG/DL (ref 9–23)
NON-UH HIE CALCIUM: 9.9 MG/DL (ref 8.7–10.4)
NON-UH HIE CALCULATED OSMOLALITY: 281 MOSM/KG (ref 275–295)
NON-UH HIE CHLORIDE: 103 MMOL/L (ref 98–107)
NON-UH HIE CO2, VENOUS: 29 MMOL/L (ref 20–31)
NON-UH HIE CREATININE RANDOM, U: 76.7 MG/DL
NON-UH HIE CREATININE: 0.7 MG/DL (ref 0.5–0.8)
NON-UH HIE DIFF?: NORMAL
NON-UH HIE EOS COUNT: 0.05 X1000 (ref 0–0.5)
NON-UH HIE EOSIN %: 0.9 %
NON-UH HIE GFR AA: >60
NON-UH HIE GLOMERULAR FILTRATION RATE: >60 ML/MIN/1.73M?
NON-UH HIE GLUCOSE: 66 MG/DL (ref 74–106)
NON-UH HIE HCT: 41.2 % (ref 36–46)
NON-UH HIE HGB: 14.1 G/DL (ref 12–16)
NON-UH HIE INSTR WBC: 5.4
NON-UH HIE K: 3.7 MMOL/L (ref 3.5–5.1)
NON-UH HIE LYMPH %: 24.4 %
NON-UH HIE LYMPH COUNT: 1.33 X1000 (ref 1.2–4.8)
NON-UH HIE MCH: 30 PG (ref 27–34)
NON-UH HIE MCHC: 34.3 G/DL (ref 32–37)
NON-UH HIE MCV: 87.5 FL (ref 80–100)
NON-UH HIE MONO %: 7.5 %
NON-UH HIE MONO COUNT: 0.41 X1000 (ref 0.1–1)
NON-UH HIE MPV: 8.2 FL (ref 7.4–10.4)
NON-UH HIE NA: 142 MMOL/L (ref 135–145)
NON-UH HIE NEUTROPHIL %: 66 %
NON-UH HIE NEUTROPHIL COUNT (ANC): 3.58 X1000 (ref 1.4–8.8)
NON-UH HIE NUCLEATED RBC: 0 /100WBC
NON-UH HIE PLATELET: 388 X10 (ref 150–450)
NON-UH HIE RBC: 4.71 X10 (ref 4.2–5.4)
NON-UH HIE RDW: 13 % (ref 11.5–14.5)
NON-UH HIE SED RATE WESTERGREN: 17 MM/HR (ref 0–30)
NON-UH HIE TOTAL PROTEIN, RANDOM URINE: 12 MG/DL (ref 1–14)
NON-UH HIE WBC: 5.4 X10 (ref 4.5–11)

## 2025-05-11 LAB
NON-UH HIE B2GLYCOPROTEIN 1, IGG ANTIBODY: <10
NON-UH HIE B2GLYCOPROTEIN 1, IGM ANTIBODY: <10
NON-UH HIE CARDIOLIPIN AB IGG: <10
NON-UH HIE CARDIOLIPIN AB IGM: <10
NON-UH HIE COMPLEMENT C3: 152 MG/DL (ref 90–180)
NON-UH HIE COMPLEMENT C4: 28 MG/DL (ref 10–40)

## 2025-05-12 ENCOUNTER — APPOINTMENT (OUTPATIENT)
Dept: PRIMARY CARE | Facility: CLINIC | Age: 55
End: 2025-05-12
Payer: COMMERCIAL

## 2025-05-12 LAB — NON-UH HIE ANTI-DNA: NORMAL

## 2025-05-14 LAB — NON-UH HIE LUPUS ANTICOAGULANT R: NORMAL

## 2025-05-15 ENCOUNTER — OFFICE VISIT (OUTPATIENT)
Dept: PRIMARY CARE | Facility: CLINIC | Age: 55
End: 2025-05-15
Payer: COMMERCIAL

## 2025-05-15 ENCOUNTER — TELEPHONE (OUTPATIENT)
Dept: PRIMARY CARE | Facility: CLINIC | Age: 55
End: 2025-05-15

## 2025-05-15 VITALS
SYSTOLIC BLOOD PRESSURE: 128 MMHG | OXYGEN SATURATION: 98 % | TEMPERATURE: 97 F | HEART RATE: 80 BPM | BODY MASS INDEX: 26.21 KG/M2 | DIASTOLIC BLOOD PRESSURE: 85 MMHG | WEIGHT: 167 LBS | HEIGHT: 67 IN

## 2025-05-15 DIAGNOSIS — M05.7A RHEUMATOID ARTHRITIS OF OTHER SITE WITH POSITIVE RHEUMATOID FACTOR: ICD-10-CM

## 2025-05-15 DIAGNOSIS — E78.2 HYPERLIPEMIA, MIXED: ICD-10-CM

## 2025-05-15 DIAGNOSIS — M45.9 ANKYLOSING SPONDYLITIS OF UNSPECIFIED SITES IN SPINE (MULTI): ICD-10-CM

## 2025-05-15 DIAGNOSIS — M65.4 DE QUERVAIN'S DISEASE (TENOSYNOVITIS): Primary | ICD-10-CM

## 2025-05-15 DIAGNOSIS — E03.9 ACQUIRED HYPOTHYROIDISM: ICD-10-CM

## 2025-05-15 DIAGNOSIS — M25.531 RIGHT WRIST PAIN: ICD-10-CM

## 2025-05-15 PROCEDURE — 99214 OFFICE O/P EST MOD 30 MIN: CPT | Performed by: INTERNAL MEDICINE

## 2025-05-15 PROCEDURE — 3008F BODY MASS INDEX DOCD: CPT | Performed by: INTERNAL MEDICINE

## 2025-05-15 PROCEDURE — 1036F TOBACCO NON-USER: CPT | Performed by: INTERNAL MEDICINE

## 2025-05-15 PROCEDURE — 20605 DRAIN/INJ JOINT/BURSA W/O US: CPT | Performed by: INTERNAL MEDICINE

## 2025-05-15 RX ORDER — METHYLPREDNISOLONE 4 MG/1
TABLET ORAL
Qty: 21 TABLET | Refills: 0 | Status: SHIPPED | OUTPATIENT
Start: 2025-05-15 | End: 2025-05-21

## 2025-05-15 RX ORDER — TRIAMCINOLONE ACETONIDE 40 MG/ML
60 INJECTION, SUSPENSION INTRA-ARTICULAR; INTRAMUSCULAR ONCE
Status: COMPLETED | OUTPATIENT
Start: 2025-05-15 | End: 2025-05-15

## 2025-05-15 RX ADMIN — TRIAMCINOLONE ACETONIDE 60 MG: 40 INJECTION, SUSPENSION INTRA-ARTICULAR; INTRAMUSCULAR at 15:43

## 2025-05-15 NOTE — TELEPHONE ENCOUNTER
Patient called, she has right wrist and thumb pain. I scheduled her for 5/19 at 4:30. She wanted to know if anything opens up either today or tomorrow to let her know so we can try to get her in sooner than Monday.

## 2025-05-15 NOTE — PROGRESS NOTES
Subjective   Patient ID: Deidra Chacon is a 55 y.o. female who presents for Hand Pain (Right- 1 week now  ) and Follow-up (Should she take vitamin d 50,000 units a day/Family history of heart should she have a stress test or an echo? ).    Assessment/Plan     Problem List Items Addressed This Visit       Hyperlipemia, mixed    Acquired hypothyroidism    Ankylosing spondylitis of unspecified sites in spine (Multi)    Rheumatoid arthritis of other site with positive rheumatoid factor    Right wrist pain    Relevant Orders    Hemoglobin A1c    Hepatitis C antibody    TSH    XR wrist right 1-2 views    De Quervain's disease (tenosynovitis) - Primary     Patient was evaluated today, problem list was reviewed, problems and concerns addressed, Rx list reviewed and updated, lab and tests were noted and reviewed. Life style changes were discussed, always it works better if we eat plant based diet and plenty of fibres and roughage. Consume adequate amount of water and avoid alcohol, light to moderate physical activities and stress reduction are always beneficial for ongoing physical well being. Do not forget to have 6 to 7 hours of sleep regularly and avoid late night jeremy screen exposure.    HPIDawjerry Chacon is a 55 y.o. female who presents for Hand Pain (Right- 1 week now  ) and Follow-up (Should she take vitamin d 50,000 units a day/Family history of heart should she have a stress test or an echo? ).  55-year-old patient rheumatoid arthritis hyperlipidemia hypothyroidism osteopenia allergic rhinitis fibromyalgia seen by rheumatology complaining of acute onset of the right wrist pain tenosynovitis aggravating factor activity relieving factor none associate problem autoimmune disease endocrine problem dehydration    Negative for skin rash or trauma    Negative for nausea vomiting constipation    Negative for headache or chest pain    Family history of heart disease advised to get a stress test    Right wrist pain given Kenalog  "injection without any complication given Medrol Dosepak vitamin C vitamin D sent for the x-ray and therapy and follow-up patient going to be ordered by the cardiology rheumatology for the same for the heart and rheumatoid arthritis.  Negative for any pregnancy    Medical History[1]  Surgical History[2]  Allergies[3]  Current Medications[4]  Family History[5]  Social History[6]  Immunization History   Administered Date(s) Administered    Flu vaccine (IIV4), preservative free *Check age/dose* 10/03/2016, 09/07/2017, 09/17/2018, 08/25/2020, 10/25/2022    Flu vaccine, quadrivalent, no egg protein, age 6 month or greater (FLUCELVAX) 11/01/2021, 10/24/2023    Flu vaccine, trivalent, preservative free, no egg protein, age 18y+ (Flublok) 09/12/2024    Influenza, Unspecified 09/09/2019    Influenza, seasonal, injectable 10/25/2022    Moderna SARS-CoV-2 Vaccination 03/18/2021, 04/15/2021, 01/06/2022    Pneumococcal polysaccharide vaccine, 23-valent, age 2 years and older (PNEUMOVAX 23) 09/09/2019    Tdap vaccine, age 7 year and older (BOOSTRIX, ADACEL) 02/07/2017    Zoster vaccine, recombinant, adult (SHINGRIX) 09/09/2019, 02/07/2020, 06/07/2020    Zoster, Unspecified 02/07/2020, 06/07/2020       Review of Systems  Review of systems is otherwise negative unless stated above or in history of present illness.    Objective   Visit Vitals  /85 (BP Location: Left arm, Patient Position: Sitting)   Pulse 80   Temp 36.1 °C (97 °F)   Ht 1.702 m (5' 7\")   Wt 75.8 kg (167 lb)   SpO2 98%   BMI 26.16 kg/m²   OB Status Hysterectomy   Smoking Status Never   BSA 1.89 m²     Physical Exam  Constitutional:       General: not in acute distress.   HENT:      Head: Normocephalic and atraumatic.      Nose: Nose normal.   Eyes:      Extraocular Movements: Extraocular movements intact.      Conjunctiva/sclera: Conjunctivae normal.   Cardiovascular: Heart murmur     Rate and Rhythm: Normal rate ,  No M/R/G  Pulmonary:      Effort: Pulmonary " effort is normal.      Breath sounds: Normal, Bilat Equal AE  Skin: Inflammation of the tendon     General: Skin is warm.   Neurological: Carpal tunnel     Mental Status: He is alert and oriented to person, place, and time.   Psychiatric:         Mood and Affect: Mood normal.         Behavior: Behavior normal.   Musculoskeletal right wrist tendinitis tenosynovitis  FROM in all extremitirs,  Joint-no swelling or tenderness    Orders Only on 05/14/2025   Component Date Value Ref Range Status    [facility] LUPUS ANTICOAGULANT R 05/14/2025 See Report^SEE REPORT   Final   Orders Only on 05/12/2025   Component Date Value Ref Range Status    [facility] ANTI-DNA 05/12/2025 Negative^NEG   Final   Orders Only on 05/11/2025   Component Date Value Ref Range Status    [facility] Complement C3 05/11/2025 152  90 - 180 mg/dL Final    [facility] Complement C4 05/11/2025 28  10 - 40 mg/dL Final    [facility] B6NDOEROZYYZEX 1, IGG A* 05/11/2025 <10  <=20 Final    [facility] C6BIPAFLWKKRNP 1, IGM A* 05/11/2025 <10  <=20 Final    [facility] Cardiolipin Ab IgG 05/11/2025 <10  <=14 Final    [facility] Cardiolipin Ab IgM 05/11/2025 <10  <=12 Final   Orders Only on 05/09/2025   Component Date Value Ref Range Status    [facility] MCHC 05/09/2025 34.3  32.0 - 37.0 g/dL Final    [facility] RBC 05/09/2025 4.71  4.20 - 5.40 x10 Final    [facility] Instr WBC 05/09/2025 5.4   Final    [facility] MCH 05/09/2025 30.0  27.0 - 34.0 pg Final    [facility] HCT 05/09/2025 41.2  36.0 - 46.0 % Final    [facility] Platelet 05/09/2025 388  150 - 450 x10 Final    [facility] RDW 05/09/2025 13.0  11.5 - 14.5 % Final    [facility] HGB 05/09/2025 14.1  12.0 - 16.0 g/dL Final    [facility] Nucleated RBC 05/09/2025 0  /100WBC Final    [facility] WBC 05/09/2025 5.4  4.5 - 11.0 x10 Final    [facility] MCV 05/09/2025 87.5  80.0 - 100.0 fL Final    [facility] MPV 05/09/2025 8.2  7.4 - 10.4 fL Final    [facility] DIFF? 05/09/2025 No^NO   Final    [facility] Neutrophil  Count (ANC) 05/09/2025 3.58  1.40 - 8.80 x1000 Final    [facility] Eosin % 05/09/2025 0.9  % Final    [facility] Eos Count 05/09/2025 0.05  0.00 - 0.50 x1000 Final    [facility] Lymph Count 05/09/2025 1.33  1.20 - 4.80 x1000 Final    [facility] Lymph % 05/09/2025 24.4  % Final    [facility] Basos % 05/09/2025 1.2  % Final    [facility] Neutrophil % 05/09/2025 66.0  % Final    [facility] Baso Count 05/09/2025 0.07  0.00 - 0.20 x1000 Final    [facility] Mono Count 05/09/2025 0.41  0.10 - 1.00 x1000 Final    [facility] Mono % 05/09/2025 7.5  % Final    [facility] Total Protein, Random U* 05/09/2025 12  1 - 14 mg/dL Final    [facility] Creatinine Random, U 05/09/2025 76.7  mg/dL Final    [facility] Na 05/09/2025 142  135 - 145 mmol/L Final    [facility] Chloride 05/09/2025 103  98 - 107 mmol/L Final    [facility] Calculated Osmolality 05/09/2025 281  275 - 295 mOsm/kg Final    [facility] GFR AA 05/09/2025 >60   Final    [facility] Calcium 05/09/2025 9.9  8.7 - 10.4 mg/dL Final    [facility] Glucose 05/09/2025 66 (L)  74 - 106 mg/dL Final    [facility] K 05/09/2025 3.7  3.5 - 5.1 mmol/L Final    [facility] Glomerular Filtration R* 05/09/2025 >60  mL/min/1.73m? Final    [facility] BUN 05/09/2025 12  9 - 23 mg/dL Final    [facility] BUN/Creat Ratio 05/09/2025 17.1   Final    [facility] CO2, venous 05/09/2025 29.0  20.0 - 31.0 mmol/L Final    [facility] Creatinine 05/09/2025 0.7  0.5 - 0.8 mg/dL Final    [facility] Sed Rate Westergren 05/09/2025 17  0 - 30 mm/hr Final   Orders Only on 02/10/2025   Component Date Value Ref Range Status    [facility] MPV 02/10/2025 8.4  7.4 - 10.4 fL Final    [facility] HGB 02/10/2025 13.4  12.0 - 16.0 g/dL Final    [facility] WBC 02/10/2025 4.8  4.5 - 11.0 x10 Final    [facility] RDW 02/10/2025 13.6  11.5 - 14.5 % Final    [facility] MCH 02/10/2025 29.2  27.0 - 34.0 pg Final    [facility] Nucleated RBC 02/10/2025 0  /100WBC Final    [facility] HCT 02/10/2025 40.6  36.0 - 46.0 % Final     [facility] Platelet 02/10/2025 308  150 - 450 x10 Final    [facility] RBC 02/10/2025 4.58  4.20 - 5.40 x10 Final    [facility] Instr WBC 02/10/2025 4.8   Final    [facility] MCHC 02/10/2025 32.9  32.0 - 37.0 g/dL Final    [facility] MCV 02/10/2025 88.8  80.0 - 100.0 fL Final    [facility] DIFF? 02/10/2025 No   Final    [facility] Mono Count 02/10/2025 0.68  0.10 - 1.00 x1000 Final    [facility] Neutrophil Count (ANC) 02/10/2025 2.38  1.40 - 8.80 x1000 Final    [facility] Neutrophil % 02/10/2025 49.4  % Final    [facility] Eos Count 02/10/2025 0.18  0.00 - 0.50 x1000 Final    [facility] Eosin % 02/10/2025 3.7  % Final    [facility] Lymph % 02/10/2025 31.8  % Final    [facility] Lymph Count 02/10/2025 1.53  1.20 - 4.80 x1000 Final    [facility] Basos % 02/10/2025 0.9  % Final    [facility] Baso Count 02/10/2025 0.04  0.00 - 0.20 x1000 Final    [facility] Mono % 02/10/2025 14.2  % Final    [facility] RSV INT QC 02/10/2025 Present   Final    [facility] Respiratory Syncytial V* 02/10/2025 Negative  Negative Final    [facility] Rapid Influenza A Antig* 02/10/2025 Negative   Final    [facility] RFAB INT QC 02/10/2025 Present   Final    [facility] Rapid Influenza B Antig* 02/10/2025 Negative   Final    [facility] COVID-19 by PCR IP 02/10/2025 Positive (A)   Final       Radiology: Reviewed imaging in powerchart.  Imaging  No results found.    Cardiology, Vascular, and Other Imaging  No other imaging results found for the past 7 days        Charting was completed using voice recognition technology and may include unintended errors.            [1]   Past Medical History:  Diagnosis Date    Candidiasis, unspecified 08/06/2020    Yeast infection    Cough, unspecified 08/01/2016    Cough    COVID-19     VACCINATED    Hyperlipidemia     Hypothyroidism     Inflammatory polyarthropathy (Multi) 09/12/2019    Polyarthritis, inflammatory    Other specified complications of surgical and medical care, not elsewhere classified, initial  encounter 12/02/2021    Fluid collection at surgical site    Otitis media, unspecified, unspecified ear 01/12/2016    Ear infection    Pain in left hip 05/22/2018    Left hip pain    Pain in right knee 09/21/2016    Right knee pain    Pain in right shoulder 09/12/2019    Right shoulder pain, unspecified chronicity    Pelvic and perineal pain 10/14/2021    Female pelvic pain    Personal history of diseases of the blood and blood-forming organs and certain disorders involving the immune mechanism 09/12/2019    History of autoimmune disorder    Personal history of other diseases of the digestive system 12/02/2021    History of umbilical hernia    Personal history of other diseases of the digestive system 12/10/2020    History of hemorrhoids    Personal history of other diseases of the musculoskeletal system and connective tissue 06/17/2019    History of low back pain    Personal history of other diseases of the musculoskeletal system and connective tissue 06/23/2016    History of bursitis    Personal history of other diseases of the nervous system and sense organs 09/02/2021    History of carpal tunnel syndrome    Personal history of other endocrine, nutritional and metabolic disease 12/17/2019    History of vitamin D deficiency    Personal history of other mental and behavioral disorders 07/27/2020    History of depression    Personal history of other specified conditions     History of abdominal pain    Personal history of urinary (tract) infections     History of urinary tract infection    Radiculopathy, cervical region 09/09/2019    Cervical radiculopathy    Radiculopathy, lumbar region 06/17/2019    Lumbar radiculopathy    Radiculopathy, lumbar region 09/09/2019    Chronic lumbar radiculopathy    Spinal stenosis, lumbar region without neurogenic claudication 01/19/2021    Spinal stenosis of lumbar region without neurogenic claudication    Unspecified urinary incontinence 10/07/2021    Urinary incontinence in  female    Urge incontinence 10/07/2021    Urge incontinence   [2]   Past Surgical History:  Procedure Laterality Date    HYSTERECTOMY  08/01/2016    Hysterectomy    LASIK      OTHER SURGICAL HISTORY Right 10/07/2021    Carpal tunnel surgery    OTHER SURGICAL HISTORY  10/07/2021    Vaginal sling procedure    OTHER SURGICAL HISTORY  12/02/2021    Umbilical hernia repair laparoscopic    SINUS SURGERY  08/01/2016    Sinus Surgery   [3]   Allergies  Allergen Reactions    Cephalexin Other     yeast infections    Oseltamivir Itching and Rash   [4]   Current Outpatient Medications   Medication Sig Dispense Refill    atorvastatin (Lipitor) 10 mg tablet Take 1 tablet (10 mg) by mouth once daily. 90 tablet 3    CeleBREX 200 mg capsule Take 1 capsule (200 mg) by mouth every other day.      cetirizine (ZyrTEC) 10 mg tablet Take 1 tablet (10 mg) by mouth once daily. 90 tablet 3    DULoxetine (Cymbalta) 30 mg DR capsule Take 1 capsule (30 mg) by mouth once daily in the evening. 90 capsule 3    fluticasone (Flonase) 50 mcg/actuation nasal spray Administer 2 sprays into each nostril once daily. 16 g 6    hydroxychloroquine (PlaqueniL) 200 mg tablet Take 1.5 tablets (300 mg) by mouth once daily. 45 tablet 3    levothyroxine (Synthroid, Levoxyl) 75 mcg tablet Take 1 tablet (75 mcg) by mouth early in the morning.. 90 tablet 1     No current facility-administered medications for this visit.   [5]   Family History  Problem Relation Name Age of Onset    COPD Mother          severe    Other (cardiac disorder) Father      Diabetes Father      Migraines Father      Cancer Maternal Grandmother      Cancer Maternal Grandfather      Heart failure Paternal Grandmother      Heart attack Paternal Grandfather     [6]   Social History  Socioeconomic History    Marital status:    Tobacco Use    Smoking status: Never    Smokeless tobacco: Never   Vaping Use    Vaping status: Never Used   Substance and Sexual Activity    Alcohol use: Yes      Alcohol/week: 2.0 standard drinks of alcohol     Types: 2 Glasses of wine per week    Drug use: Never    Sexual activity: Yes     Partners: Male

## 2025-05-16 ENCOUNTER — TELEPHONE (OUTPATIENT)
Dept: PRIMARY CARE | Facility: CLINIC | Age: 55
End: 2025-05-16
Payer: COMMERCIAL

## 2025-05-16 DIAGNOSIS — E03.9 ACQUIRED HYPOTHYROIDISM: ICD-10-CM

## 2025-05-16 LAB
EST. AVERAGE GLUCOSE BLD GHB EST-MCNC: 117 MG/DL
EST. AVERAGE GLUCOSE BLD GHB EST-SCNC: 6.5 MMOL/L
HBA1C MFR BLD: 5.7 %
HCV AB SERPL QL IA: NORMAL
TSH SERPL-ACNC: 0.17 MIU/L (ref 0.4–4.5)

## 2025-05-16 NOTE — TELEPHONE ENCOUNTER
----- Message from Han Caldera sent at 5/16/2025 11:50 AM EDT -----  Hemoglobin A1c 5.7 TSH 0.17    Low-carb diet    Continue levothyroxine 50 mcg a day check check T3-T4 TSH 2 weeks  ----- Message -----  From: [facility] Results In  Sent: 5/16/2025   2:15 AM EDT  To: Han Caldera MD

## 2025-05-19 ENCOUNTER — APPOINTMENT (OUTPATIENT)
Dept: PRIMARY CARE | Facility: CLINIC | Age: 55
End: 2025-05-19
Payer: COMMERCIAL

## 2025-05-19 RX ORDER — LEVOTHYROXINE SODIUM 50 UG/1
50 TABLET ORAL DAILY
Qty: 30 TABLET | Refills: 1 | Status: SHIPPED | OUTPATIENT
Start: 2025-05-19

## 2025-05-29 ENCOUNTER — APPOINTMENT (OUTPATIENT)
Dept: PRIMARY CARE | Facility: CLINIC | Age: 55
End: 2025-05-29
Payer: COMMERCIAL

## 2025-05-29 VITALS
SYSTOLIC BLOOD PRESSURE: 131 MMHG | BODY MASS INDEX: 26.21 KG/M2 | TEMPERATURE: 97.2 F | HEIGHT: 67 IN | DIASTOLIC BLOOD PRESSURE: 86 MMHG | WEIGHT: 167 LBS | OXYGEN SATURATION: 98 % | HEART RATE: 72 BPM

## 2025-05-29 DIAGNOSIS — M65.4 DE QUERVAIN'S DISEASE (TENOSYNOVITIS): Primary | ICD-10-CM

## 2025-05-29 DIAGNOSIS — E03.9 ACQUIRED HYPOTHYROIDISM: ICD-10-CM

## 2025-05-29 DIAGNOSIS — E78.2 HYPERLIPEMIA, MIXED: ICD-10-CM

## 2025-05-29 DIAGNOSIS — M79.7 FIBROMYALGIA: ICD-10-CM

## 2025-05-29 PROBLEM — M25.531 RIGHT WRIST PAIN: Status: RESOLVED | Noted: 2025-05-15 | Resolved: 2025-05-29

## 2025-05-29 PROBLEM — B34.9 PHARYNGITIS WITH VIRAL SYNDROME: Status: RESOLVED | Noted: 2025-02-10 | Resolved: 2025-05-29

## 2025-05-29 PROBLEM — M05.7A: Status: RESOLVED | Noted: 2025-02-10 | Resolved: 2025-05-29

## 2025-05-29 PROBLEM — M45.9 ANKYLOSING SPONDYLITIS OF UNSPECIFIED SITES IN SPINE (MULTI): Status: RESOLVED | Noted: 2024-03-11 | Resolved: 2025-05-29

## 2025-05-29 PROBLEM — J02.9 PHARYNGITIS WITH VIRAL SYNDROME: Status: RESOLVED | Noted: 2025-02-10 | Resolved: 2025-05-29

## 2025-05-29 PROCEDURE — 1036F TOBACCO NON-USER: CPT | Performed by: INTERNAL MEDICINE

## 2025-05-29 PROCEDURE — 99214 OFFICE O/P EST MOD 30 MIN: CPT | Performed by: INTERNAL MEDICINE

## 2025-05-29 PROCEDURE — 3008F BODY MASS INDEX DOCD: CPT | Performed by: INTERNAL MEDICINE

## 2025-05-29 ASSESSMENT — PATIENT HEALTH QUESTIONNAIRE - PHQ9
SUM OF ALL RESPONSES TO PHQ9 QUESTIONS 1 AND 2: 0
1. LITTLE INTEREST OR PLEASURE IN DOING THINGS: NOT AT ALL
2. FEELING DOWN, DEPRESSED OR HOPELESS: NOT AT ALL

## 2025-05-29 NOTE — PROGRESS NOTES
Subjective   Patient ID: Deidra Chacon is a 55 y.o. female who presents for Follow-up (2 week on right wrist ).    Assessment/Plan     Problem List Items Addressed This Visit       Hyperlipemia, mixed    Acquired hypothyroidism    Fibromyalgia    De Quervain's disease (tenosynovitis) - Primary     Hemoglobin A1c 5.7 TSH 0.17     Low-carb diet     Continue levothyroxine 50 mcg a day check check T3-T4 TSH 2 weeks    X-ray of the wrist arthritis  HPI 55-year-old patient have autoimmune arthritis evaluated by myself for tenosynovitis affecting the right wrist given cortisone shot hand therapy and hand support with help of the cortisone Celebrex pain is much better but not 100%    Negative for weakness tingling numbness or radiating pain to the elbow wrist or the jaw    Review lab medication hemoglobin A1c 5.7 advise low-carb diet    Iatrogenic hypothyroidism will cut down the levothyroxine was taking 75 mcg nightly now to 50 mcg a day    Sent for the blood test to check the thyroid and electrolytes and sed rate.  Review x-ray discussed with patient review lab discussed with patient  Medical History[1]  Surgical History[2]  Allergies[3]  Current Medications[4]  Family History[5]  Social History[6]  Immunization History   Administered Date(s) Administered    Flu vaccine (IIV4), preservative free *Check age/dose* 10/03/2016, 09/07/2017, 09/17/2018, 08/25/2020, 10/25/2022    Flu vaccine, quadrivalent, no egg protein, age 6 month or greater (FLUCELVAX) 11/01/2021, 10/24/2023    Flu vaccine, trivalent, preservative free, no egg protein, age 18y+ (Flublok) 09/12/2024    Influenza, Unspecified 09/09/2019    Influenza, seasonal, injectable 10/25/2022    Moderna SARS-CoV-2 Vaccination 03/18/2021, 04/15/2021, 01/06/2022    Pneumococcal polysaccharide vaccine, 23-valent, age 2 years and older (PNEUMOVAX 23) 09/09/2019    Tdap vaccine, age 7 year and older (BOOSTRIX, ADACEL) 02/07/2017    Zoster vaccine, recombinant, adult (SHINGRIX)  "09/09/2019, 02/07/2020, 06/07/2020    Zoster, Unspecified 02/07/2020, 06/07/2020       Review of Systems  Review of systems is otherwise negative unless stated above or in history of present illness.    Objective   Visit Vitals  /86 (BP Location: Left arm, Patient Position: Sitting)   Pulse 72   Temp 36.2 °C (97.2 °F)   Ht 1.702 m (5' 7\")   Wt 75.8 kg (167 lb)   SpO2 98%   BMI 26.16 kg/m²   OB Status Hysterectomy   Smoking Status Never   BSA 1.89 m²     Physical Exam  Constitutional: Mild anxiety     General: not in acute distress.   HENT:      Head: Normocephalic and atraumatic.      Nose: Nose normal.   Eyes: No jaundice     Extraocular Movements: Extraocular movements intact.      Conjunctiva/sclera: Conjunctivae normal.   Cardiovascular:      Rate and Rhythm: Normal rate ,  No M/R/G  Pulmonary:      Effort: Pulmonary effort is normal.      Breath sounds: Normal, Bilat Equal AE  Skin: Dry skin     General: Skin is warm.   Neurological: Carpal tunnel     Mental Status: He is alert and oriented to person, place, and time.   Psychiatric:         Mood and Affect: Mood normal.         Behavior: Behavior normal.   Musculoskeletal tenosynovitis right forearm and wrist  FROM in all extremitirs,  Joint-no swelling or tenderness    Office Visit on 05/15/2025   Component Date Value Ref Range Status    HEMOGLOBIN A1c 05/15/2025 5.7 (H)  <5.7 % Final    eAG (mg/dL) 05/15/2025 117  mg/dL Final    eAG (mmol/L) 05/15/2025 6.5  mmol/L Final    HEPATITIS C ANTIBODY 05/15/2025 NON-REACTIVE  NON-REACTIVE Final    TSH 05/15/2025 0.17 (L)  0.40 - 4.50 mIU/L Final   Orders Only on 05/14/2025   Component Date Value Ref Range Status    [facility] LUPUS ANTICOAGULANT R 05/14/2025 See Report^SEE REPORT   Final   Orders Only on 05/12/2025   Component Date Value Ref Range Status    [facility] ANTI-DNA 05/12/2025 Negative^NEG   Final   Orders Only on 05/11/2025   Component Date Value Ref Range Status    [facility] Complement C3 " 05/11/2025 152  90 - 180 mg/dL Final    [facility] Complement C4 05/11/2025 28  10 - 40 mg/dL Final    [facility] [id number] 1, IGG A* 05/11/2025 <10  <=20 Final    [facility] [id number] 1, IGM A* 05/11/2025 <10  <=20 Final    [facility] Cardiolipin Ab IgG 05/11/2025 <10  <=14 Final    [facility] Cardiolipin Ab IgM 05/11/2025 <10  <=12 Final   Orders Only on 05/09/2025   Component Date Value Ref Range Status    [facility] MCHC 05/09/2025 34.3  32.0 - 37.0 g/dL Final    [facility] RBC 05/09/2025 4.71  4.20 - 5.40 x10 Final    [facility] Instr WBC 05/09/2025 5.4   Final    [facility] MCH 05/09/2025 30.0  27.0 - 34.0 pg Final    [facility] HCT 05/09/2025 41.2  36.0 - 46.0 % Final    [facility] Platelet 05/09/2025 388  150 - 450 x10 Final    [facility] RDW 05/09/2025 13.0  11.5 - 14.5 % Final    [facility] HGB 05/09/2025 14.1  12.0 - 16.0 g/dL Final    [facility] Nucleated RBC 05/09/2025 0  /100WBC Final    [facility] WBC 05/09/2025 5.4  4.5 - 11.0 x10 Final    [facility] MCV 05/09/2025 87.5  80.0 - 100.0 fL Final    [facility] MPV 05/09/2025 8.2  7.4 - 10.4 fL Final    [facility] DIFF? 05/09/2025 No^NO   Final    [facility] Neutrophil Count (ANC) 05/09/2025 3.58  1.40 - 8.80 x1000 Final    [facility] Eosin % 05/09/2025 0.9  % Final    [facility] Eos Count 05/09/2025 0.05  0.00 - 0.50 x1000 Final    [facility] Lymph Count 05/09/2025 1.33  1.20 - 4.80 x1000 Final    [facility] Lymph % 05/09/2025 24.4  % Final    [facility] Basos % 05/09/2025 1.2  % Final    [facility] Neutrophil % 05/09/2025 66.0  % Final    [facility] Baso Count 05/09/2025 0.07  0.00 - 0.20 x1000 Final    [facility] Mono Count 05/09/2025 0.41  0.10 - 1.00 x1000 Final    [facility] Mono % 05/09/2025 7.5  % Final    [facility] Total Protein, Random U* 05/09/2025 12  1 - 14 mg/dL Final    [facility] Creatinine Random, U 05/09/2025 76.7  mg/dL Final    [facility] Na 05/09/2025 142  135 - 145 mmol/L Final    [facility] Chloride 05/09/2025 103  98 -  107 mmol/L Final    [facility] Calculated Osmolality 05/09/2025 281  275 - 295 mOsm/kg Final    [facility] GFR AA 05/09/2025 >60   Final    [facility] Calcium 05/09/2025 9.9  8.7 - 10.4 mg/dL Final    [facility] Glucose 05/09/2025 66 (L)  74 - 106 mg/dL Final    [facility] K 05/09/2025 3.7  3.5 - 5.1 mmol/L Final    [facility] Glomerular Filtration R* 05/09/2025 >60  mL/min/1.73m? Final    [facility] BUN 05/09/2025 12  9 - 23 mg/dL Final    [facility] BUN/Creat Ratio 05/09/2025 17.1   Final    [facility] CO2, venous 05/09/2025 29.0  20.0 - 31.0 mmol/L Final    [facility] Creatinine 05/09/2025 0.7  0.5 - 0.8 mg/dL Final    [facility] Sed Rate Westergren 05/09/2025 17  0 - 30 mm/hr Final   Orders Only on 02/10/2025   Component Date Value Ref Range Status    [facility] MPV 02/10/2025 8.4  7.4 - 10.4 fL Final    [facility] HGB 02/10/2025 13.4  12.0 - 16.0 g/dL Final    [facility] WBC 02/10/2025 4.8  4.5 - 11.0 x10 Final    [facility] RDW 02/10/2025 13.6  11.5 - 14.5 % Final    [facility] MCH 02/10/2025 29.2  27.0 - 34.0 pg Final    [facility] Nucleated RBC 02/10/2025 0  /100WBC Final    [facility] HCT 02/10/2025 40.6  36.0 - 46.0 % Final    [facility] Platelet 02/10/2025 308  150 - 450 x10 Final    [facility] RBC 02/10/2025 4.58  4.20 - 5.40 x10 Final    [facility] Instr WBC 02/10/2025 4.8   Final    [facility] MCHC 02/10/2025 32.9  32.0 - 37.0 g/dL Final    [facility] MCV 02/10/2025 88.8  80.0 - 100.0 fL Final    [facility] DIFF? 02/10/2025 No   Final    [facility] Mono Count 02/10/2025 0.68  0.10 - 1.00 x1000 Final    [facility] Neutrophil Count (ANC) 02/10/2025 2.38  1.40 - 8.80 x1000 Final    [facility] Neutrophil % 02/10/2025 49.4  % Final    [facility] Eos Count 02/10/2025 0.18  0.00 - 0.50 x1000 Final    [facility] Eosin % 02/10/2025 3.7  % Final    [facility] Lymph % 02/10/2025 31.8  % Final    [facility] Lymph Count 02/10/2025 1.53  1.20 - 4.80 x1000 Final    [facility] Basos % 02/10/2025 0.9  % Final    [facility] Baso Count 02/10/2025 0.04  0.00 - 0.20 x1000 Final    [facility] Mono % 02/10/2025 14.2  % Final    [facility] RSV INT QC 02/10/2025 Present   Final    [facility] Respiratory Syncytial V* 02/10/2025 Negative  Negative Final    [facility] Rapid Influenza A Antig* 02/10/2025 Negative   Final    [facility] RFAB INT QC 02/10/2025 Present   Final    [facility] Rapid Influenza B Antig* 02/10/2025 Negative   Final    [facility] COVID-19 by PCR IP 02/10/2025 Positive (A)   Final       Radiology: Reviewed imaging in powerchart.  Imaging  No results found.    Cardiology, Vascular, and Other Imaging  XR wrist right 3+ views  Result Date: 5/25/2025  EXAM DESCRIPTION: XR WRIST RIGHT COMPLETE RadLex: XR WRIST 3 OR MORE VIEWS RIGHT CLINICAL HISTORY: PAIN; COMPARISON: None. FINDINGS: The visualized osseous structures appear intact. No evidence of fracture, dislocation or radiographic foreign body. The soft tissues are unremarkable. IMPRESSION: 1.  Unremarkable right wrist radiographs. Electronically signed by:  Nazario Arellano MD  05/25/2025 10:22 AM EDT RP Workstation: [id number] Technologist:  AN Dictated By:   NAZARIO ARELLANO MD Signed By:     NAZARIO ARELLANO MD Signed Out:    05/25/25 10:22:41          Charting was completed using voice recognition technology and may include unintended errors.            [1]   Past Medical History:  Diagnosis Date    Candidiasis, unspecified 08/06/2020    Yeast infection    Cough, unspecified 08/01/2016    Cough    COVID-19     VACCINATED    Hyperlipidemia     Hypothyroidism     Inflammatory polyarthropathy (Multi) 09/12/2019    Polyarthritis, inflammatory    Other specified complications of surgical and medical care, not elsewhere classified, initial encounter 12/02/2021    Fluid collection at surgical site    Otitis media, unspecified, unspecified ear 01/12/2016    Ear infection    Pain in left hip 05/22/2018    Left hip pain    Pain in right knee 09/21/2016    Right knee pain    Pain in  right shoulder 09/12/2019    Right shoulder pain, unspecified chronicity    Pelvic and perineal pain 10/14/2021    Female pelvic pain    Personal history of diseases of the blood and blood-forming organs and certain disorders involving the immune mechanism 09/12/2019    History of autoimmune disorder    Personal history of other diseases of the digestive system 12/02/2021    History of umbilical hernia    Personal history of other diseases of the digestive system 12/10/2020    History of hemorrhoids    Personal history of other diseases of the musculoskeletal system and connective tissue 06/17/2019    History of low back pain    Personal history of other diseases of the musculoskeletal system and connective tissue 06/23/2016    History of bursitis    Personal history of other diseases of the nervous system and sense organs 09/02/2021    History of carpal tunnel syndrome    Personal history of other endocrine, nutritional and metabolic disease 12/17/2019    History of vitamin D deficiency    Personal history of other mental and behavioral disorders 07/27/2020    History of depression    Personal history of other specified conditions     History of abdominal pain    Personal history of urinary (tract) infections     History of urinary tract infection    Radiculopathy, cervical region 09/09/2019    Cervical radiculopathy    Radiculopathy, lumbar region 06/17/2019    Lumbar radiculopathy    Radiculopathy, lumbar region 09/09/2019    Chronic lumbar radiculopathy    Spinal stenosis, lumbar region without neurogenic claudication 01/19/2021    Spinal stenosis of lumbar region without neurogenic claudication    Unspecified urinary incontinence 10/07/2021    Urinary incontinence in female    Urge incontinence 10/07/2021    Urge incontinence   [2]   Past Surgical History:  Procedure Laterality Date    HYSTERECTOMY  08/01/2016    Hysterectomy    LASIK      OTHER SURGICAL HISTORY Right 10/07/2021    Carpal tunnel surgery     OTHER SURGICAL HISTORY  10/07/2021    Vaginal sling procedure    OTHER SURGICAL HISTORY  12/02/2021    Umbilical hernia repair laparoscopic    SINUS SURGERY  08/01/2016    Sinus Surgery   [3]   Allergies  Allergen Reactions    Cephalexin Other     yeast infections    Oseltamivir Itching and Rash   [4]   Current Outpatient Medications   Medication Sig Dispense Refill    atorvastatin (Lipitor) 10 mg tablet Take 1 tablet (10 mg) by mouth once daily. 90 tablet 3    CeleBREX 200 mg capsule Take 1 capsule (200 mg) by mouth every other day.      cetirizine (ZyrTEC) 10 mg tablet Take 1 tablet (10 mg) by mouth once daily. 90 tablet 3    DULoxetine (Cymbalta) 30 mg DR capsule Take 1 capsule (30 mg) by mouth once daily in the evening. 90 capsule 3    fluticasone (Flonase) 50 mcg/actuation nasal spray Administer 2 sprays into each nostril once daily. 16 g 6    hydroxychloroquine (PlaqueniL) 200 mg tablet Take 1.5 tablets (300 mg) by mouth once daily. 45 tablet 3    levothyroxine (Synthroid, Levoxyl) 50 mcg tablet Take 1 tablet (50 mcg) by mouth early in the morning.. 30 tablet 1     No current facility-administered medications for this visit.   [5]   Family History  Problem Relation Name Age of Onset    COPD Mother          severe    Other (cardiac disorder) Father      Diabetes Father      Migraines Father      Cancer Maternal Grandmother      Cancer Maternal Grandfather      Heart failure Paternal Grandmother      Heart attack Paternal Grandfather     [6]   Social History  Socioeconomic History    Marital status:    Tobacco Use    Smoking status: Never    Smokeless tobacco: Never   Vaping Use    Vaping status: Never Used   Substance and Sexual Activity    Alcohol use: Yes     Alcohol/week: 2.0 standard drinks of alcohol     Types: 2 Glasses of wine per week    Drug use: Never    Sexual activity: Yes     Partners: Male

## 2025-07-07 NOTE — PROGRESS NOTES
Subjective   Patient ID: Deidra Chacon is a 55 y.o. female who presents for follow-up regarding fibromyalgia.    HPI 55-year-old female here for follow up regarding fibromyalgia.   The patient has complaints of joint pain dating back to about 2009.  I reviewed office visit note from Dr. Worrell from October 2011.  At that time the patient had a history of DARRON 1:40, RNP+, and episodic joint pain.    She was also previously diagnosed with fibromyalgia, for which she takes duloxetine 30 mg daily.    The patient was having left hip pain in 2011.  MRI of the left hip showed a subchondral cyst.    Most recently the patient has been seeing Dr. Stokes for rheumatologic evaluation.    Through the years, she has also had neck and low back pain.  Dr. Stokes at 1 point thought perhaps she has ankylosing spondylitis.  She had previous RFA of L4-5 and SI joints by Dr. De Leon, which did help.  She relates that she has not had back pain recently.    She did see Dr. Soto regarding low back pain in 2018.  She was diagnosed with degenerative disc disease at L3-4 and L4-5.  She also had bilateral lateral recess narrowing at L4-5.    She previously tried sulfasalazine which did not help.  She takes Celebrex as needed which does help.  She took hydroxychloroquine in 2019, then took it again earlier this year.  She stopped hydroxychloroquine 10/24 because she was not sure it was helping.    She fractured her right ankle November 2024 (avulsion fracture of lateral malleolus).  She still has some right medial ankle and midfoot pain.  She does have some pain in both feet, for which she sees Dr. Barajas in podiatry.  The foot pain comes and goes.    She has some triggering in her left third finger.    She has recently had some trouble sleeping, she wakes up in the middle of the night.    She did have several previous injections in the left greater trochanter for trochanteric bursitis.  She had surgery March 2024 for trochanteric bursitis and  removal of lipoma from over the left lateral hip.  She notes that this did significantly help her pain.    Because of the diagnosis of possible mixed connective tissue disease (DARRON+, RNP 4 with arthralgias), she decided to try hydroxychloroquine again.  She started hydroxychloroquine 300 mg daily 3/25.  She does note that she is feeling better.  She notes less joint pain.  She would like to continue the medication..    She did have an episode of right De quervain's tenosynovitis, which responded to local corticosteroid injection.    DEXA December 2024: T-score -1.6 lumbar spine, T-score normal left femoral neck and left total hip.    Labs March 2023: DARRON 1:160 (homogeneous pattern), RNP 4, double-stranded DNA 8 (5-9 equivocal, positive is greater than equal to 10), SSA and SSB antibodies negative, rheumatoid factor negative, ESR 35  Labs December 2024: CK 69, CMP normal, TSH 1.61, cholesterol 202, HDL 76, , triglycerides 82.,  DARRON 1:160 (homogeneous), RNP 4.2, double-stranded DNA 5 (5-9 equivocal).  ESR 13, CRP 0.33 (normal less than 1), rheumatoid factor negative, Citrulline antibody negative  Labs 2/25: CBC normal  Labs 5/25: CBC with diff normal, CMP normal except glucose 66, ESR 17, C3 and C4 normal, beta-2 glycoprotein antibody negative, anticardiolipin antibody negative, lupus anticoagulant negative , double-stranded DNA negative, spot urine protein to creatinine ratio 0.156    Medications:  Reviewed as documented    Medical problem list:  Hypothyroidism  fibromyalgia  Hyperlipidemia    Surgery:  Left hip bursectomy and removal of lipoma 3/24  Hysterectomy, unilateral oophorectomy (laparoscopy) 2014  Right foot surgery for neuroma 15 years ago  D and C, ablation 2009  Right hand carpal tunnel 2021  Umbilical hernia repair 2021    Social history:  .  Homemaker.  Denies use of tobacco. Drinks alcohol socially.    Family history:  Father- diabetes, CAD  Mother- COPD, osteoporosis  No family history  "of RA, SLE, or inflammatory arthritis.      Review of Systems  General: Denies fevers or chills.  HEENT: c/o mild dry eyes, denies dry mouth.  CV: Denies chest pain or palpitations.  Denies leg edema.  Lungs: Denies coughing or shortness of breath.  Skin: Denies rashes or nodules.  Denies alopecia or malar rash.  MS: Denies joint pain or joint swelling.    Objective   Visit Vitals  /74   Pulse 70   Temp 36.2 °C (97.2 °F)   Resp 14   Ht 1.702 m (5' 7\")   Wt 73.9 kg (163 lb)   SpO2 99%   BMI 25.53 kg/m²   OB Status Hysterectomy   Smoking Status Never   BSA 1.87 m²        Physical Exam  General appearance: Well-nourished well-appearing.  HEENT: PERRL, EOMI. no malar rash.  Neck: Supple, no nodes.  CV: RRR, no MGR.  Lungs: Clear, no rales or wheezes.  Abdomen: Soft, nontender. No hepatosplenomegaly.  Extremities:  No cyanosis, clubbing, or edema.  MS: No synovitis.  Skin: No rashes or nodules. No sclerodactyly.      Assessment/Plan     Problem List Items Addressed This Visit           ICD-10-CM    DARRON positive R76.8    Fibromyalgia - Primary M79.7    Mixed connective tissue disease (Multi) M35.1     Other Visit Diagnoses         Codes      Long-term use of hydroxychloroquine     Z79.899    Relevant Orders    Referral to Ophthalmology                ? MCTD- She has positive DARRON with positive RNP antibody of 4.2, which could be consistent with mixed connective tissue disease.  She is having less joint pain on hydroxychloroquine 300 mg daily.    Low back pain - apparently did previously respond to epidural injections at L4-5, as well as RFA of L4-5 and SI joints.  She is not currently having low back pain or low back stiffness.  She has relatively normal range of motion of lumbar spine.  I think it is unlikely at this point that she has ankylosing spondylitis.    Fibromyalgia.  I recommend that she continue with duloxetine 30 mg daily.    BMI 25- stable.    Plan:  Continue hydroxychloroquine 400 mg every other day " alternating with 200 mg every other day.  Referred for baseline eye exam.  Follow up in 3-4 months.

## 2025-07-08 ENCOUNTER — APPOINTMENT (OUTPATIENT)
Dept: RHEUMATOLOGY | Facility: CLINIC | Age: 55
End: 2025-07-08
Payer: COMMERCIAL

## 2025-07-08 VITALS
OXYGEN SATURATION: 99 % | RESPIRATION RATE: 14 BRPM | TEMPERATURE: 97.2 F | HEIGHT: 67 IN | HEART RATE: 70 BPM | BODY MASS INDEX: 25.58 KG/M2 | DIASTOLIC BLOOD PRESSURE: 74 MMHG | WEIGHT: 163 LBS | SYSTOLIC BLOOD PRESSURE: 122 MMHG

## 2025-07-08 DIAGNOSIS — Z79.899 LONG-TERM USE OF HYDROXYCHLOROQUINE: ICD-10-CM

## 2025-07-08 DIAGNOSIS — R76.8 ANA POSITIVE: ICD-10-CM

## 2025-07-08 DIAGNOSIS — M35.1 MIXED CONNECTIVE TISSUE DISEASE (MULTI): ICD-10-CM

## 2025-07-08 DIAGNOSIS — M79.7 FIBROMYALGIA: Primary | ICD-10-CM

## 2025-07-08 PROCEDURE — 1036F TOBACCO NON-USER: CPT | Performed by: INTERNAL MEDICINE

## 2025-07-08 PROCEDURE — 99214 OFFICE O/P EST MOD 30 MIN: CPT | Performed by: INTERNAL MEDICINE

## 2025-07-08 PROCEDURE — 3008F BODY MASS INDEX DOCD: CPT | Performed by: INTERNAL MEDICINE

## 2025-07-08 ASSESSMENT — PATIENT HEALTH QUESTIONNAIRE - PHQ9
2. FEELING DOWN, DEPRESSED OR HOPELESS: NOT AT ALL
1. LITTLE INTEREST OR PLEASURE IN DOING THINGS: NOT AT ALL
SUM OF ALL RESPONSES TO PHQ9 QUESTIONS 1 AND 2: 0
2. FEELING DOWN, DEPRESSED OR HOPELESS: NOT AT ALL
1. LITTLE INTEREST OR PLEASURE IN DOING THINGS: NOT AT ALL
SUM OF ALL RESPONSES TO PHQ9 QUESTIONS 1 AND 2: 0

## 2025-07-08 ASSESSMENT — PAIN SCALES - GENERAL: PAINLEVEL_OUTOF10: 0-NO PAIN

## 2025-07-08 NOTE — PATIENT INSTRUCTIONS
Continue hydroxychloroquine 400 mg every other day alternating with 200 mg every other day.  Referred for baseline eye exam.  Follow up in 3-4 months.

## 2025-11-11 ENCOUNTER — APPOINTMENT (OUTPATIENT)
Dept: RHEUMATOLOGY | Facility: CLINIC | Age: 55
End: 2025-11-11
Payer: COMMERCIAL

## 2025-11-17 ENCOUNTER — APPOINTMENT (OUTPATIENT)
Dept: OPHTHALMOLOGY | Facility: CLINIC | Age: 55
End: 2025-11-17
Payer: COMMERCIAL

## (undated) DEVICE — GLOVE, SURGICAL, PROTEXIS PI BLUE W/NEUTHERA, 7.0, PF, LF

## (undated) DEVICE — DRAPE, SHEET, XL

## (undated) DEVICE — Device

## (undated) DEVICE — WOUND SYSTEM, DEBRIDEMENT & CLEANING, O.R DUOPAK

## (undated) DEVICE — GLOVE, SURGICAL, PROTEXIS PI BLUE W/NEUTHERA, 8.0, PF, LF

## (undated) DEVICE — BANDAGE, ESMARK, 6 IN X 9 FT, STERILE

## (undated) DEVICE — DRESSING, GAUZE, SPONGE, 12 PLY, 4 X 4 IN, PLASTIC POUCH, STRL 10PK

## (undated) DEVICE — DRESSING, MEPILEX BORDER, POST-OP AG, 4 X 10 IN

## (undated) DEVICE — GOWN, SURGICAL, ROYAL SILK, LG, STERILE

## (undated) DEVICE — SOLUTION, TOPICAL, ALCOHOL, ISOPROPYL 70%, 16 OZ

## (undated) DEVICE — GLOVE, SURGICAL, PROTEXIS PI , 7.0, PF, LF

## (undated) DEVICE — STOCKINETTE, IMPERVIOUS, LARGE, 9IN X 48IN

## (undated) DEVICE — STRAP, ARM BOARD, 32 X 1.5

## (undated) DEVICE — STRAP, VELCRO, BODY, 4 X 60IN, NS

## (undated) DEVICE — SUTURE, VICRYL, 0, 18 IN, CT-1, VIOLET

## (undated) DEVICE — DRAPE, SHEET, EXTREMITY, W/ARM BOARD COVERS, 87 X 106 X 128 IN, DISPOSABLE, LF, STERILE

## (undated) DEVICE — BATH BLANKET STERILE

## (undated) DEVICE — GLOVE, SURGICAL, PROTEXIS PI , 7.5, PF, LF

## (undated) DEVICE — ADHESIVE, SKIN, LIQUIBAND EXCEED

## (undated) DEVICE — PADDING, CAST, SPECIALIST, 6 IN X 4 YD

## (undated) DEVICE — APPLICATOR, CHLORAPREP, W/ORANGE TINT, 26ML

## (undated) DEVICE — STRIP, SKIN CLOSURE, STERI STRIP, REINFORCED, 0.5 X 4 IN

## (undated) DEVICE — BANDAGE, COFLEX, 4 X 5 YDS, FOAM TAN, STERILE, LF

## (undated) DEVICE — GOWN, SURGICAL, ROYAL SILK, XL, STERILE

## (undated) DEVICE — MANIFOLD, 4 PORT NEPTUNE STANDARD

## (undated) DEVICE — DRESSING, GAUZE, SPONGE, 8 PLY, CURITY, 4 X 4, LF